# Patient Record
Sex: FEMALE | Race: WHITE | NOT HISPANIC OR LATINO | Employment: STUDENT | URBAN - METROPOLITAN AREA
[De-identification: names, ages, dates, MRNs, and addresses within clinical notes are randomized per-mention and may not be internally consistent; named-entity substitution may affect disease eponyms.]

---

## 2017-02-07 ENCOUNTER — ALLSCRIPTS OFFICE VISIT (OUTPATIENT)
Dept: OTHER | Facility: OTHER | Age: 22
End: 2017-02-07

## 2018-01-12 NOTE — PROGRESS NOTES
Assessment    1  Contraception (V25 9) (Z30 9)   2  Encounter for routine gynecological examination (V72 31) (Z01 419)    Plan  Contraception    · From  Tri-Previfem 0 18/0 215/0 25 MG-35 MCG Oral Tablet TAKE ONE  TABLET BY MOUTH EVERY DAY To Tri-Previfem 0 18/0 215/0 25 MG-35 MCG Oral  Tablet Take 1 tablet daily   Rx By: [facility]; Dispense: 84 Days ; #:1 X 28 Tablet Disp Pack (3 Disp Packs); Refill: 3; For: Contraception; KAMINI = Y; Sent To: [facility]/PHARMACY #0600 Encounter for routine gynecological examination    · Follow-up visit in 1 year Evaluation and Treatment  Follow-up  Status: Hold For -  Scheduling  Requested for: [id number]   Ordered; For: Encounter for routine gynecological examination; Ordered By: [facility] Performed:  Due: [id number]    Discussion/Summary    1  Yearly exam-Pap smear deferred, self breast awareness reviewed  2  History of bacterial vaginosis/yeast-resolved with treatment at last visit  3  Contraception-patient is doing well on Tri-Previfem  Electronic prescription for 3 packs refill 3 was sent to the local pharmacy  The patient will check to make sure she can get the prescription in this way  If any problems, she will contact us  4  History of oligomenorrhea/dysmenorrhea/menorrhagia-resolved with OCP  5  Acne-patient has seen dermatology in the past  She has noted improvement on OCP  6  Other-STD testing was deferred as it was done recently  she will follow-up in 1 year or as needed  Chief Complaint    1  Visit For: Preventive General Multisystem Exam    History of Present Illness  HPI: The patient was seen today for yearly exam  She denies any complaints  Her vaginitis symptoms have resolved since treatment at the last visit  Review of Systems    Constitutional: No fever, no chills, feels well, no tiredness, no recent weight gain or loss  ENT: no ear ache, no loss of hearing, no nosebleeds or nasal discharge, no sore throat or hoarseness     Cardiovascular: no complaints of slow or fast heart rate, no chest pain, no palpitations, no leg claudication or lower extremity edema  Respiratory: no complaints of shortness of breath, no wheezing, no dyspnea on exertion, no orthopnea or PND  Breasts: no complaints of breast pain, breast lump or nipple discharge  Gastrointestinal: no complaints of abdominal pain, no constipation, no nausea or diarrhea, no vomiting, no bloody stools  Genitourinary: no complaints of dysuria, no incontinence, no pelvic pain, no dysmenorrhea, no vaginal discharge or abnormal vaginal bleeding  Musculoskeletal: no complaints of arthralgia, no myalgia, no joint swelling or stiffness, no limb pain or swelling  Integumentary: no complaints of skin rash or lesion, no itching or dry skin, no skin wounds  Neurological: no complaints of headache, no confusion, no numbness or tingling, no dizziness or fainting  Active Problems    1  Acne (706 1) (L70 9)   2  Contraception (V25 9) (Z30 9)   3  Denied: History of self breast exam   4  Oligomenorrhea (626 1) (N91 5)    Past Medical History    · History of ADHD (attention deficit hyperactivity disorder), predominantly hyperactive  impulsive type (314 01) (F90 1)   · History of  0 (V61 8) (Z63 8)   · History of concussion (V15 52) (U95 319)   · History of dysmenorrhea (V13 29) (Z87 42)   · Denied: History of self breast exam   · History of Menorrhagia with regular cycle (626 2) (N92 0)    The active problems and past medical history were reviewed and updated today        Surgical History    · History of Appendectomy    Family History    · No pertinent family history    · Family history of hypertension (V17 49) (Z82 49)    · Family history of asthma (V17 5) (Z82 5)    Social History    · Denied: History of Alcohol use   · Always uses seat belt   · Caffeine use (V49 89) (F15 90)   · Denied: History of Drug use   · Denied: History of    · Never a smoker   · No preference on Zoroastrianism beliefs   · Oral contraceptives   · Single    Current Meds   1  Tri-Previfem 0 18/0 215/0 25 MG-35 MCG Oral Tablet; TAKE ONE TABLET BY MOUTH   EVERY DAY; Therapy: [id number] to [phone])  Requested for: 20Jan2016; Last   Rx:20Jan2016 Ordered    Allergies    1  No Known Drug Allergies    2  No Known Environmental Allergies   3  No Known Food Allergies    Vitals   Recorded: 14RZQ8955 30:31EV   Systolic 302, RUE, Sitting   Diastolic 78, RUE, Sitting   Height 5 ft 7 in   2-20 Stature Percentile 86 %   Weight 160 lb    2-20 Weight Percentile 88 %   BMI Calculated 25 06   BMI Percentile 80 %   BSA Calculated 1 84   LMP 25Jan2016     Physical Exam    Constitutional   General appearance: No acute distress, well appearing and well nourished  Neck   Neck: Normal, supple, trachea midline, no masses  Thyroid: Normal, no thyromegaly  Genitourinary   External genitalia: Normal and no lesions appreciated  Vagina: Normal, no lesions or dryness appreciated  Urethra: Normal     Urethral meatus: Normal     Bladder: Normal, soft, non-tender and no prolapse or masses appreciated  Cervix: Normal, no palpable masses  without lesions or discharge or cervicitis  No CMT  Uterus: Normal, non-tender, not enlarged, and no palpable masses  mid position, normal size, nontender, without mass  Adnexa/parametria: Normal, non-tender and no fullness or masses appreciated  Anus, perineum, and rectum: Normal sphincter tone, no masses, and no prolapse  Chest   Breasts: Normal and no dimpling or skin changes noted  Abdomen   Abdomen: Normal, non-tender, and no organomegaly noted  Liver and spleen: No hepatomegaly or splenomegaly  Examination for hernias: No hernias appreciated  Lymphatic   Palpation of lymph nodes in neck, axillae, groin and/or other locations: No lymphadenopathy or masses noted  Skin   Skin and subcutaneous tissue: Normal skin turgor and no rashes      Palpation of skin and subcutaneous tissue: Normal     Psychiatric   Orientation to person, place, and time: Normal     Mood and affect: Normal        Signatures   Electronically signed by : VERA Dominguez ; Feb 2 2016  5:49PM EST                       (Author)

## 2018-01-14 VITALS
HEIGHT: 67 IN | SYSTOLIC BLOOD PRESSURE: 106 MMHG | BODY MASS INDEX: 26.13 KG/M2 | DIASTOLIC BLOOD PRESSURE: 60 MMHG | WEIGHT: 166.5 LBS

## 2018-03-09 ENCOUNTER — TELEPHONE (OUTPATIENT)
Dept: OBGYN CLINIC | Facility: CLINIC | Age: 23
End: 2018-03-09

## 2018-04-04 ENCOUNTER — ANNUAL EXAM (OUTPATIENT)
Dept: OBGYN CLINIC | Facility: CLINIC | Age: 23
End: 2018-04-04
Payer: COMMERCIAL

## 2018-04-04 VITALS
DIASTOLIC BLOOD PRESSURE: 78 MMHG | SYSTOLIC BLOOD PRESSURE: 102 MMHG | WEIGHT: 172.6 LBS | HEIGHT: 66 IN | BODY MASS INDEX: 27.74 KG/M2

## 2018-04-04 DIAGNOSIS — Z11.51 SCREENING FOR HPV (HUMAN PAPILLOMAVIRUS): Primary | ICD-10-CM

## 2018-04-04 DIAGNOSIS — Z12.4 SCREENING FOR CERVICAL CANCER: ICD-10-CM

## 2018-04-04 DIAGNOSIS — Z01.419 WOMEN'S ANNUAL ROUTINE GYNECOLOGICAL EXAMINATION: ICD-10-CM

## 2018-04-04 DIAGNOSIS — Z30.41 ENCOUNTER FOR SURVEILLANCE OF CONTRACEPTIVE PILLS: ICD-10-CM

## 2018-04-04 PROCEDURE — G0145 SCR C/V CYTO,THINLAYER,RESCR: HCPCS | Performed by: PATHOLOGY

## 2018-04-04 PROCEDURE — 99395 PREV VISIT EST AGE 18-39: CPT | Performed by: OBSTETRICS & GYNECOLOGY

## 2018-04-04 PROCEDURE — G0124 SCREEN C/V THIN LAYER BY MD: HCPCS | Performed by: PATHOLOGY

## 2018-04-04 PROCEDURE — 87624 HPV HI-RISK TYP POOLED RSLT: CPT | Performed by: OBSTETRICS & GYNECOLOGY

## 2018-04-04 RX ORDER — DROSPIRENONE AND ETHINYL ESTRADIOL 0.02-3(28)
1 KIT ORAL DAILY
Qty: 28 TABLET | Refills: 12 | Status: SHIPPED | OUTPATIENT
Start: 2018-04-04 | End: 2019-04-22 | Stop reason: SDUPTHER

## 2018-04-04 RX ORDER — TRETINOIN 0.1 MG/G
GEL TOPICAL
Refills: 5 | COMMUNITY
Start: 2018-01-17

## 2018-04-04 RX ORDER — NORGESTIMATE AND ETHINYL ESTRADIOL 7DAYSX3 28
1 KIT ORAL DAILY
COMMUNITY
Start: 2015-07-22 | End: 2018-04-04 | Stop reason: DRUGHIGH

## 2018-04-04 NOTE — PROGRESS NOTES
Assessment/Plan: 1  Yearly exam-Pap smear done, self breast awareness reviewed  2  History of bacterial vaginosis/yeast-resolved with treatment previously  3  Contraception-patient is doing well on Tri-Previfem  However, her acne is worsened recently and she wished to try another pill  We discussed Elena the patient is interested in this  Electronic prescription for 1 pack refill numeral 12 was sent a local pharmacy  She will call or return with any issues  4  History of oligomenorrhea/dysmenorrhea/menorrhagia-resolved with OCP  5  Acne-patient has seen dermatology in the past  She had noted improvement on OCP, but now she will try Elena with prescription as written above  6  Other-STD testing was deferred as it was done December 2015  She has been with the same partner for 4 5 years  She is status post Gardisil injection x3 previously  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 goes to [employer] in the fall  My congratulations were given  She will follow-up in 1 year or as needed  No problem-specific Assessment & Plan notes found for this encounter  Diagnoses and all orders for this visit:    Screening for HPV (human papillomavirus)  -     Liquid-based pap, screening    Screening for cervical cancer  -     Liquid-based pap, screening    Women's annual routine gynecological examination    Encounter for surveillance of contraceptive pills    Other orders  -     Discontinue: norgestimate-ethinyl estradiol (TRI-PREVIFEM) 0 18/0 215/0 25 MG-35 MCG per tablet; Take 1 tablet by mouth daily  -     tretinoin (RETIN-A) 0 01 % gel; USE 1 (ONE) APPLICATION(S) AT BEDTIME  APPLY SPARINGLY  Subjective:      Patient ID: Nevaeh Ceron is a 26 y o  female  The patient was seen today for yearly exam   Please see assessment plan for details          The following portions of the patient's history were reviewed and updated as appropriate: allergies, current medications, past family history, past medical history, past social history, past surgical history and problem list     Review of Systems   Constitutional: Negative for chills, diaphoresis, fatigue and fever  Respiratory: Negative for apnea, cough, chest tightness, shortness of breath and wheezing  Cardiovascular: Negative for chest pain, palpitations and leg swelling  Gastrointestinal: Negative for abdominal distention, abdominal pain, anal bleeding, constipation, diarrhea, nausea, rectal pain and vomiting  Genitourinary: Negative for difficulty urinating, dyspareunia, dysuria, frequency, hematuria, menstrual problem, pelvic pain, urgency, vaginal bleeding, vaginal discharge and vaginal pain  Musculoskeletal: Negative for arthralgias, back pain and myalgias  Skin: Negative for color change and rash  Neurological: Negative for dizziness, syncope, light-headedness, numbness and headaches  Hematological: Negative for adenopathy  Does not bruise/bleed easily  Psychiatric/Behavioral: Negative for dysphoric mood and sleep disturbance  The patient is not nervous/anxious  Objective:      /78 (BP Location: Left arm, Patient Position: Sitting, Cuff Size: Standard)   Ht 5' 6" (1 676 m)   Wt 78 3 kg (172 lb 9 6 oz)   LMP 03/15/2018 (Exact Date)   BMI 27 86 kg/m²          Physical Exam    Objective      /78 (BP Location: Left arm, Patient Position: Sitting, Cuff Size: Standard)   Ht 5' 6" (1 676 m)   Wt 78 3 kg (172 lb 9 6 oz)   LMP 03/15/2018 (Exact Date)   BMI 27 86 kg/m²     General:   alert and oriented, in no acute distress, alert, appears stated age and cooperative   Neck: normal to inspection and palpation   Breast: normal appearance, no masses or tenderness   Heart:    Lungs:    Abdomen: soft, non-tender, without masses or organomegaly   Vulva: normal   Vagina: Without lesions or discharge noted  No erythema appreciated   Cervix:  Without lesions or discharge or cervicitis  No Cervical motion tenderness    Light bleeding with Pap test   Uterus: normal size, anteverted, non-tender   Adnexa: no mass, fullness, tenderness   Rectum: negative

## 2018-04-04 NOTE — PATIENT INSTRUCTIONS
Follow-up 1 year for yearly examAcne   WHAT YOU NEED TO KNOW:   What is acne? Acne is a skin condition that is common in adolescents  It usually gets better over time, and is usually gone by about age 22  Acne can continue into adulthood for some people  What causes or increases my risk for acne? Acne occurs when pores become blocked with dead skin cells, oil, or bacteria  Pores are openings in your skin where oil, sweat, and hair are produced  Acne may be caused by high hormone levels during puberty  Hormonal changes caused by birth control pills, menstrual cycles, or pregnancy can cause acne in females  Medicines such as antidepressants and antiseizure medicines can also cause acne  Sensitive skin or a family history of acne increases your risk  The following can make your acne worse:  · Oil-based cosmetics or hair products, or suntan oil    · Rubbing your skin too hard or picking at your pimples    · Stress    · Pressure on the skin from sports helmets or backpacks  What are the different types of acne? Acne most often appears on the face, neck, upper chest, back, and upper arms  · Whiteheads  are closed, white bumps that form when the pore is completely blocked  · Blackheads  are tiny, dark spots that form when the pore is blocked but stays open  · Pimples  are inflamed bumps that contain pus  They are often caused by clogged pores  Pimples develop when whiteheads or blackheads get infected  · Cystic acne  is made up of large inflamed nodules or cysts that contain pus  They look like large pimples and form deep inside the skin  They may cause pain and scars  How is acne diagnosed? Your healthcare provider will examine your skin and ask if you have ever received treatment for acne  Tell him or her how long your acne lasts and which face care products you use  Your provider may ask if anything makes your acne better or worse  You may need blood tests to check your hormone levels    How is acne treated? Treatment depends on how severe your acne is  Your healthcare provider may recommend any of the following:  · Over-the-counter acne medicines  with benzoyl peroxide and salicylic acid may help to treat mild acne  They are available in the form of gels, lotions, creams, pads, or soaps  It may take several weeks for you to see an improvement  Follow the directions on the medicine label  Do not use more than directed  This medicine can cause dry and red skin if you use too much  · Prescription medicines  may be needed if over-the-counter medicines do not help after 2 months  You may need to take more than one kind of medicine to treat your acne  This may include antibiotics that kill bacteria and help decrease swelling  A type of prescription acne medicine called retinoids may cause serious birth defects  Do not  use this medicine if you are pregnant or may become pregnant  · Light therapy  may help decrease your acne  Ask your healthcare provider for more information about light therapy  What else can I do to manage or prevent acne? · Wash your face 2 times a day  with a gentle cleanser  This helps decrease oil buildup that leads to acne  Also wash your face if you have been sweating a lot, such as after exercise  · Use oil-free products  This includes sunscreen, moisturizers, and cosmetics  Hair products should also be oil-free  · Regularly wash your hair  to decrease oil  Oily hair that touches your face can increase acne  · Avoid touching your face  as much as possible  Do not pick, squeeze, or pop your pimples  This can make your acne worse because your hands contain oil  It can also cause scars to form on your face  · Avoid things that rub against your skin  as much as possible  This includes hats, helmets, and backpacks  When should I contact my healthcare provider? · You use retinoid medicine and you think you might be pregnant      · You use retinoid medicine and begin to have mood swings or personality changes  · You feel depressed  · You have a fever and inflammation of your skin  · Your acne does not get better, even after treatment  · You have questions or concerns about your condition or care  CARE AGREEMENT:   You have the right to help plan your care  Learn about your health condition and how it may be treated  Discuss treatment options with your caregivers to decide what care you want to receive  You always have the right to refuse treatment  The above information is an  only  It is not intended as medical advice for individual conditions or treatments  Talk to your doctor, nurse or pharmacist before following any medical regimen to see if it is safe and effective for you  © 2017 [employer]  Information is for End User's use only and may not be sold, redistributed or otherwise used for commercial purposes  All illustrations and images included in CareNotes® are the copyrighted property of A D A M , Inc  or [employer]

## 2018-04-13 LAB — HPV RRNA GENITAL QL NAA+PROBE: NORMAL

## 2018-04-18 LAB
LAB AP GYN PRIMARY INTERPRETATION: NORMAL
Lab: NORMAL
PATH INTERP SPEC-IMP: NORMAL

## 2018-08-01 DIAGNOSIS — B37.3 VAGINAL YEAST INFECTION: Primary | ICD-10-CM

## 2018-08-01 RX ORDER — FLUCONAZOLE 150 MG/1
150 TABLET ORAL
Qty: 2 TABLET | Refills: 0 | Status: SHIPPED | OUTPATIENT
Start: 2018-08-01 | End: 2018-08-09

## 2018-08-01 NOTE — TELEPHONE ENCOUNTER
You saw this pt in April for her yearly    She has signs of a yeast infection and she tried the 3 day Monistat with no relief    Can we give her this script?

## 2018-08-09 ENCOUNTER — DOCTOR'S OFFICE (OUTPATIENT)
Dept: URBAN - METROPOLITAN AREA CLINIC 136 | Facility: CLINIC | Age: 23
Setting detail: OPHTHALMOLOGY
End: 2018-08-09
Payer: COMMERCIAL

## 2018-08-09 DIAGNOSIS — H52.13: ICD-10-CM

## 2018-08-09 PROCEDURE — 92310 CONTACT LENS FITTING OU: CPT | Performed by: OPTOMETRIST

## 2018-08-09 PROCEDURE — 92004 COMPRE OPH EXAM NEW PT 1/>: CPT | Performed by: OPTOMETRIST

## 2018-08-09 PROCEDURE — 92015 DETERMINE REFRACTIVE STATE: CPT | Performed by: OPTOMETRIST

## 2018-08-09 ASSESSMENT — REFRACTION_MANIFEST
OD_VA2: 20/
OU_VA: 20/
OD_VA1: 20/
OS_VA1: 20/
OS_VA3: 20/
OU_VA: 20/
OS_VA2: 20/
OS_VA3: 20/
OD_VA3: 20/
OD_VA2: 20/
OD_VA1: 20/
OS_VA1: 20/
OS_VA2: 20/
OD_VA3: 20/

## 2018-08-09 ASSESSMENT — REFRACTION_CURRENTRX
OS_OVR_VA: 20/
OS_OVR_VA: 20/
OD_OVR_VA: 20/
OD_OVR_VA: 20/
OS_OVR_VA: 20/
OD_OVR_VA: 20/

## 2018-08-09 ASSESSMENT — REFRACTION_OUTSIDERX
OD_VA1: 20/20
OD_VA3: 20/
OD_VA2: 20/
OS_SPHERE: -1.25
OD_SPHERE: -1.00
OS_VA2: 20/
OS_CYLINDER: SPH
OS_VA3: 20/
OS_VA1: 20/20
OU_VA: 20/20
OD_CYLINDER: SPH

## 2018-08-09 ASSESSMENT — CONFRONTATIONAL VISUAL FIELD TEST (CVF)
OS_FINDINGS: FULL
OD_FINDINGS: FULL

## 2018-08-09 ASSESSMENT — VISUAL ACUITY
OS_BCVA: 20/20
OD_BCVA: 20/20

## 2018-08-16 ENCOUNTER — TELEPHONE (OUTPATIENT)
Dept: OBGYN CLINIC | Facility: CLINIC | Age: 23
End: 2018-08-16

## 2018-09-07 ENCOUNTER — OPTICAL OFFICE (OUTPATIENT)
Dept: URBAN - METROPOLITAN AREA CLINIC 143 | Facility: CLINIC | Age: 23
Setting detail: OPHTHALMOLOGY
End: 2018-09-07
Payer: COMMERCIAL

## 2018-09-07 DIAGNOSIS — H52.13: ICD-10-CM

## 2018-09-07 PROCEDURE — S0500 DISPOS CONT LENS: HCPCS | Performed by: OPTOMETRIST

## 2018-12-20 ENCOUNTER — TELEPHONE (OUTPATIENT)
Dept: UROLOGY | Facility: AMBULATORY SURGERY CENTER | Age: 23
End: 2018-12-20

## 2018-12-20 NOTE — TELEPHONE ENCOUNTER
Reason for appointment/Complaint/Diagnosis : pain in abdomen/hx of gross hematuria    Insurance: [employer]    History of Cancer? no                       If yes, what kind? Previous urologist?     None                  Records requested/where? No    Outside testing/where? No    Location Preference for office visit?  [place]

## 2018-12-26 RX ORDER — MOXIFLOXACIN 5 MG/ML
SOLUTION/ DROPS OPHTHALMIC
Refills: 0 | COMMUNITY
Start: 2018-11-09

## 2018-12-26 RX ORDER — TOBRAMYCIN 3 MG/ML
SOLUTION/ DROPS OPHTHALMIC
Refills: 0 | COMMUNITY
Start: 2018-10-09

## 2018-12-26 RX ORDER — ISOTRETINOIN 40 MG/1
40 CAPSULE, LIQUID FILLED ORAL 2 TIMES DAILY WITH MEALS
Refills: 0 | COMMUNITY
Start: 2018-11-16

## 2019-04-22 DIAGNOSIS — Z30.41 ENCOUNTER FOR SURVEILLANCE OF CONTRACEPTIVE PILLS: ICD-10-CM

## 2019-04-24 ENCOUNTER — ANNUAL EXAM (OUTPATIENT)
Dept: OBGYN CLINIC | Facility: CLINIC | Age: 24
End: 2019-04-24
Payer: COMMERCIAL

## 2019-04-24 VITALS
WEIGHT: 164 LBS | BODY MASS INDEX: 25.74 KG/M2 | SYSTOLIC BLOOD PRESSURE: 108 MMHG | DIASTOLIC BLOOD PRESSURE: 72 MMHG | HEIGHT: 67 IN

## 2019-04-24 DIAGNOSIS — L70.9 ACNE, UNSPECIFIED ACNE TYPE: ICD-10-CM

## 2019-04-24 DIAGNOSIS — Z01.419 WOMEN'S ANNUAL ROUTINE GYNECOLOGICAL EXAMINATION: ICD-10-CM

## 2019-04-24 DIAGNOSIS — Z30.41 ENCOUNTER FOR SURVEILLANCE OF CONTRACEPTIVE PILLS: Primary | ICD-10-CM

## 2019-04-24 PROCEDURE — 99395 PREV VISIT EST AGE 18-39: CPT | Performed by: OBSTETRICS & GYNECOLOGY

## 2019-04-24 RX ORDER — DROSPIRENONE AND ETHINYL ESTRADIOL 0.02-3(28)
1 KIT ORAL DAILY
Qty: 84 TABLET | Refills: 3 | Status: SHIPPED | OUTPATIENT
Start: 2019-04-24 | End: 2020-06-04 | Stop reason: SDUPTHER

## 2019-06-26 ENCOUNTER — DOCTOR'S OFFICE (OUTPATIENT)
Dept: URBAN - METROPOLITAN AREA CLINIC 136 | Facility: CLINIC | Age: 24
Setting detail: OPHTHALMOLOGY
End: 2019-06-26

## 2019-06-26 DIAGNOSIS — H52.13: ICD-10-CM

## 2019-06-26 PROCEDURE — SELFPAYVIS VISION VISIT SELF PAY: Performed by: OPTOMETRIST

## 2019-06-26 ASSESSMENT — SPHEQUIV_DERIVED
OD_SPHEQUIV: -1
OS_SPHEQUIV: -1.625

## 2019-06-26 ASSESSMENT — REFRACTION_MANIFEST
OD_CYLINDER: SPH
OS_CYLINDER: SPH
OU_VA: 20/20
OS_VA1: 20/
OD_VA3: 20/
OD_VA3: 20/
OD_SPHERE: -1.25
OD_VA1: 20/20
OS_VA3: 20/
OS_VA3: 20/
OU_VA: 20/
OS_SPHERE: -1.75
OD_VA2: 20/20
OS_VA2: 20/20
OS_VA1: 20/20
OD_VA1: 20/
OD_VA2: 20/
OS_VA2: 20/

## 2019-06-26 ASSESSMENT — VISUAL ACUITY
OS_BCVA: 20/20
OD_BCVA: 20/20

## 2019-06-26 ASSESSMENT — KERATOMETRY
METHOD_AUTO_MANUAL: AUTO
OD_K2POWER_DIOPTERS: 43.75
OS_K2POWER_DIOPTERS: 43.00
OS_AXISANGLE_DEGREES: 065
OD_AXISANGLE_DEGREES: 097
OS_K1POWER_DIOPTERS: 42.25
OD_K1POWER_DIOPTERS: 43.00

## 2019-06-26 ASSESSMENT — REFRACTION_CURRENTRX
OD_OVR_VA: 20/
OD_OVR_VA: 20/
OS_OVR_VA: 20/
OD_OVR_VA: 20/

## 2019-06-26 ASSESSMENT — CONFRONTATIONAL VISUAL FIELD TEST (CVF)
OS_FINDINGS: FULL
OD_FINDINGS: FULL

## 2019-06-26 ASSESSMENT — REFRACTION_AUTOREFRACTION
OS_AXIS: 116
OD_CYLINDER: -0.50
OD_SPHERE: -0.75
OS_SPHERE: -1.25
OS_CYLINDER: -0.75
OD_AXIS: 022

## 2019-06-26 ASSESSMENT — AXIALLENGTH_DERIVED
OS_AL: 24.5867
OD_AL: 24.0356

## 2019-07-31 ENCOUNTER — DOCTOR'S OFFICE (OUTPATIENT)
Dept: URBAN - METROPOLITAN AREA CLINIC 136 | Facility: CLINIC | Age: 24
Setting detail: OPHTHALMOLOGY
End: 2019-07-31

## 2019-07-31 DIAGNOSIS — H52.13: ICD-10-CM

## 2019-07-31 PROCEDURE — CLFUP CONTACT LENS FOLLOW-UP: Performed by: OPTOMETRIST

## 2019-07-31 ASSESSMENT — REFRACTION_CURRENTRX
OS_OVR_VA: 20/
OS_OVR_VA: 20/
OD_OVR_VA: 20/
OS_OVR_VA: 20/
OD_OVR_VA: 20/
OD_OVR_VA: 20/

## 2019-07-31 ASSESSMENT — REFRACTION_AUTOREFRACTION
OD_SPHERE: -0.75
OD_AXIS: 022
OS_CYLINDER: -0.75
OS_AXIS: 116
OS_SPHERE: -1.25
OD_CYLINDER: -0.50

## 2019-07-31 ASSESSMENT — REFRACTION_MANIFEST
OS_VA2: 20/
OS_VA3: 20/
OS_SPHERE: -1.75
OS_VA1: 20/20
OD_VA2: 20/
OS_VA3: 20/
OU_VA: 20/20
OD_VA2: 20/20
OS_VA1: 20/
OD_CYLINDER: SPH
OS_CYLINDER: SPH
OU_VA: 20/
OS_VA2: 20/20
OD_VA3: 20/
OD_VA1: 20/
OD_VA3: 20/
OD_SPHERE: -1.25
OD_VA1: 20/20

## 2019-07-31 ASSESSMENT — VISUAL ACUITY
OS_BCVA: 20/20
OD_BCVA: 20/20

## 2019-07-31 ASSESSMENT — SPHEQUIV_DERIVED
OS_SPHEQUIV: -1.625
OD_SPHEQUIV: -1

## 2019-09-12 ENCOUNTER — OPTICAL OFFICE (OUTPATIENT)
Dept: URBAN - METROPOLITAN AREA CLINIC 143 | Facility: CLINIC | Age: 24
Setting detail: OPHTHALMOLOGY
End: 2019-09-12
Payer: COMMERCIAL

## 2019-09-12 DIAGNOSIS — H52.13: ICD-10-CM

## 2019-09-12 PROCEDURE — S0500 DISPOS CONT LENS: HCPCS | Performed by: OPTOMETRIST

## 2019-11-24 DIAGNOSIS — Z30.41 ENCOUNTER FOR SURVEILLANCE OF CONTRACEPTIVE PILLS: ICD-10-CM

## 2019-11-25 NOTE — TELEPHONE ENCOUNTER
Received prescription request   Patient was seen April 2019 with 84 pills with 3 refills sent at that time  Please investigate why prescription request receive now    Thanks

## 2019-12-14 DIAGNOSIS — Z30.41 ENCOUNTER FOR SURVEILLANCE OF CONTRACEPTIVE PILLS: ICD-10-CM

## 2020-04-20 ENCOUNTER — TELEPHONE (OUTPATIENT)
Dept: OBGYN CLINIC | Facility: CLINIC | Age: 25
End: 2020-04-20

## 2020-06-04 DIAGNOSIS — Z30.41 ENCOUNTER FOR SURVEILLANCE OF CONTRACEPTIVE PILLS: ICD-10-CM

## 2020-06-04 RX ORDER — DROSPIRENONE AND ETHINYL ESTRADIOL 0.02-3(28)
1 KIT ORAL DAILY
Qty: 84 TABLET | Refills: 0 | Status: SHIPPED | OUTPATIENT
Start: 2020-06-04 | End: 2020-08-27

## 2020-06-04 RX ORDER — DROSPIRENONE AND ETHINYL ESTRADIOL 0.02-3(28)
1 KIT ORAL DAILY
Qty: 84 TABLET | Refills: 0 | Status: SHIPPED | OUTPATIENT
Start: 2020-06-04 | End: 2020-06-04 | Stop reason: SDUPTHER

## 2020-08-12 ENCOUNTER — ANNUAL EXAM (OUTPATIENT)
Dept: OBGYN CLINIC | Facility: CLINIC | Age: 25
End: 2020-08-12
Payer: COMMERCIAL

## 2020-08-12 VITALS
DIASTOLIC BLOOD PRESSURE: 70 MMHG | HEIGHT: 67 IN | TEMPERATURE: 97.7 F | SYSTOLIC BLOOD PRESSURE: 120 MMHG | BODY MASS INDEX: 25.43 KG/M2 | WEIGHT: 162 LBS

## 2020-08-12 DIAGNOSIS — Z30.41 ORAL CONTRACEPTIVE USE: Primary | ICD-10-CM

## 2020-08-12 PROCEDURE — 99395 PREV VISIT EST AGE 18-39: CPT | Performed by: OBSTETRICS & GYNECOLOGY

## 2020-08-12 RX ORDER — DROSPIRENONE AND ETHINYL ESTRADIOL 0.02-3(28)
KIT ORAL
COMMUNITY
End: 2020-08-12 | Stop reason: SDUPTHER

## 2020-08-12 RX ORDER — DROSPIRENONE AND ETHINYL ESTRADIOL 0.02-3(28)
1 KIT ORAL DAILY
Qty: 84 TABLET | Refills: 3 | Status: SHIPPED | OUTPATIENT
Start: 2020-08-12 | End: 2021-08-25 | Stop reason: SDUPTHER

## 2020-08-12 NOTE — PROGRESS NOTES
CC:  Annual exam    HPI: Nikko Dutton presents for routine yearly visit  Medicine is doing well and offers no complaints or concerns at this time  She has been taking Gianvi for contraception and denies any adverse effects  She wishes to continue  Past Medical History:  Past Medical History:   Diagnosis Date    ADHD (attention deficit hyperactivity disorder), predominantly hyperactive impulsive type     Concussion     Dysmenorrhea     Urinary tract infection        Past Surgical History:  Past Surgical History:   Procedure Laterality Date    APPENDECTOMY      TONSILLECTOMY AND ADENOIDECTOMY         Past OB/Gyn History:  Menstrual cycles every 28 days, with 4 days of light bleeding  Denies any history of sexually transmitted infection  No history of abnormal pap smears   Her last pap smear was 2018 and normal     ALLERGIES: No Known Allergies    MEDS:   Current Outpatient Medications:     CLARAVIS 40 MG capsule    drospirenone-ethinyl estradiol (Gianvi) 3-0 02 MG per tablet    drospirenone-ethinyl estradiol (Gianvi) 3-0 02 MG per tablet    moxifloxacin (VIGAMOX) 0 5 % ophthalmic solution    tobramycin (TOBREX) 0 3 % SOLN    tretinoin (RETIN-A) 0 01 % gel    Family History:  Family History   Problem Relation Age of Onset    Hypertension Father     Asthma Maternal Grandmother     Breast cancer Maternal Grandmother     Ovarian cysts Maternal Grandmother     Ovarian cysts Mother     Ovarian cysts Paternal Aunt        Social History:  Social History     Socioeconomic History    Marital status: Single     Spouse name: Not on file    Number of children: Not on file    Years of education: Not on file    Highest education level: Not on file   Occupational History    Not on file   Social Needs    Financial resource strain: Not on file    Food insecurity     Worry: Not on file     Inability: Not on file    Transportation needs     Medical: Not on file     Non-medical: Not on file   Tobacco Use    Smoking status: Never Smoker    Smokeless tobacco: Never Used   Substance and Sexual Activity    Alcohol use: Yes     Comment: Socially    Drug use: No    Sexual activity: Yes     Partners: Male     Birth control/protection: Pill, Condom   Lifestyle    Physical activity     Days per week: Not on file     Minutes per session: Not on file    Stress: Not on file   Relationships    Social connections     Talks on phone: Not on file     Gets together: Not on file     Attends Faith service: Not on file     Active member of club or organization: Not on file     Attends meetings of clubs or organizations: Not on file     Relationship status: Not on file    Intimate partner violence     Fear of current or ex partner: Not on file     Emotionally abused: Not on file     Physically abused: Not on file     Forced sexual activity: Not on file   Other Topics Concern    Not on file   Social History Narrative    Always wears seat belt     Caffeine use     No preference on Faith beliefs          Review of Systems:  Gen:   Denies fatigue, chills, nausea, vomiting, fever  Skin: No rashes or discolorations of any concern  RESP: Denies SOB, no cough  CV: Denies chest pain or palpitations  Breasts: Denies masses, pain, skin changes and nipple discharge  GI: Denies abdominal pain, heartburn, nausea, vomiting, changes in bowel habits  : Denies dysuria, frequency, CVA tenderness, incontinence and hematuria  Genitalia: Denies abnormal vaginal discharge, external lesions, rashes, pelvic pain, pressure, abnormal bleeding  Rectal:  Denies pain, bleeding, hemorrhoids,    Physical Exam:  /70 (BP Location: Left arm, Patient Position: Sitting, Cuff Size: Standard)   Temp 97 7 °F (36 5 °C)   Ht 5' 7" (1 702 m)   Wt 73 5 kg (162 lb)   LMP 08/11/2020   BMI 25 37 kg/m²    Gen: The patient was alert and oriented x3, pleasant well-appearing female in no acute distress     Neck:  Unremarkable, no lymphadenopathy, no thyromegaly, or tenderness  CV:  RRR, no murmurs  Resp:  Clear to auscultation bilaterally, no wheezing  Breasts: Symmetric  No dominant, discrete, fixed  or suspicious masses are noted  No skin or nipple changes  No palpable axillary nodes  No supraclavicular adenopathy  Abd:  Soft, nontender, nondistended, no masses or organomegaly  Back:  No CVA tenderness, no tenderness to palpation along spine  Pelvic:  Normal appearing external female genitalia, no visible lesions, no rashes  Vagina is free of discharge, normal vaginal epithelium, no abnormal  lesions, no evidence of prolapse anteriorly or posteriorly  Normal appearing cervix, mobile and nontender  A thin prep pap smear was not obtained today  Uterus is normal size, mobile and, nontender  No palpable adnexal masses or tenderness  No anoperineal lesions  Rectal:  No masses, tenderness, hemorrhoids, or obvious blood  Skin:  No concerning lesions  Extremeties: No edema      Assessment & Plan:   1  Routine annual exam      RTO one year orPRN  2  Encounter for follow-up of birth control pill usage  Patient may continue for 1 year

## 2020-10-21 ENCOUNTER — TELEPHONE (OUTPATIENT)
Dept: OBGYN CLINIC | Facility: CLINIC | Age: 25
End: 2020-10-21

## 2020-10-22 ENCOUNTER — TELEPHONE (OUTPATIENT)
Dept: OTHER | Facility: OTHER | Age: 25
End: 2020-10-22

## 2020-11-04 ENCOUNTER — CONSULT (OUTPATIENT)
Dept: OBGYN CLINIC | Facility: CLINIC | Age: 25
End: 2020-11-04
Payer: COMMERCIAL

## 2020-11-04 VITALS
WEIGHT: 164 LBS | TEMPERATURE: 97.3 F | HEIGHT: 67 IN | DIASTOLIC BLOOD PRESSURE: 80 MMHG | BODY MASS INDEX: 25.74 KG/M2 | SYSTOLIC BLOOD PRESSURE: 120 MMHG

## 2020-11-04 DIAGNOSIS — M54.6 CHRONIC MIDLINE THORACIC BACK PAIN: Primary | ICD-10-CM

## 2020-11-04 DIAGNOSIS — G89.29 CHRONIC MIDLINE THORACIC BACK PAIN: Primary | ICD-10-CM

## 2020-11-04 PROCEDURE — 99213 OFFICE O/P EST LOW 20 MIN: CPT | Performed by: OBSTETRICS & GYNECOLOGY

## 2021-08-25 ENCOUNTER — ANNUAL EXAM (OUTPATIENT)
Dept: OBGYN CLINIC | Facility: CLINIC | Age: 26
End: 2021-08-25
Payer: COMMERCIAL

## 2021-08-25 VITALS — HEIGHT: 67 IN | WEIGHT: 173.6 LBS | BODY MASS INDEX: 27.25 KG/M2

## 2021-08-25 DIAGNOSIS — Z30.41 ENCOUNTER FOR SURVEILLANCE OF CONTRACEPTIVE PILLS: ICD-10-CM

## 2021-08-25 DIAGNOSIS — Z30.41 ORAL CONTRACEPTIVE USE: ICD-10-CM

## 2021-08-25 DIAGNOSIS — Z01.419 ENCOUNTER FOR GYNECOLOGICAL EXAMINATION WITHOUT ABNORMAL FINDING: Primary | ICD-10-CM

## 2021-08-25 DIAGNOSIS — N39.0 FREQUENT URINARY TRACT INFECTIONS: ICD-10-CM

## 2021-08-25 PROCEDURE — G0145 SCR C/V CYTO,THINLAYER,RESCR: HCPCS | Performed by: OBSTETRICS & GYNECOLOGY

## 2021-08-25 PROCEDURE — 99395 PREV VISIT EST AGE 18-39: CPT | Performed by: OBSTETRICS & GYNECOLOGY

## 2021-08-25 RX ORDER — DROSPIRENONE AND ETHINYL ESTRADIOL 0.02-3(28)
1 KIT ORAL DAILY
Qty: 84 TABLET | Refills: 3 | Status: SHIPPED | OUTPATIENT
Start: 2021-08-25 | End: 2021-11-17

## 2021-08-25 NOTE — PROGRESS NOTES
CC:  Annual exam    HPI: Junior Aguilar presents for annual gyn exam   Jonelle Fnoseca is doing well and her only concern is the issue regarding bladder infections  Over the past several months, the patient already has had for bladder infections  She was advised to have a consultation with a urologist at a business card of the [facility] screw was given her  She is doing well with her birth control pill and denies any adverse effects  Sandy would like to stay on these  Aniyah Sinks Past Medical History:  Past Medical History:   Diagnosis Date    ADHD (attention deficit hyperactivity disorder), predominantly hyperactive impulsive type     Concussion     Dysmenorrhea     Urinary tract infection        Past Surgical History:  Past Surgical History:   Procedure Laterality Date    APPENDECTOMY      TONSILLECTOMY AND ADENOIDECTOMY         Past OB/Gyn History:  Menstrual cycles every 28 days, with 3 days of normal bleeding  Denies any history of sexually transmitted infection  No history of abnormal pap smears  Her last pap smear was  2018      ALLERGIES: No Known Allergies    MEDS:   Current Outpatient Medications:     CLARAVIS 40 MG capsule    drospirenone-ethinyl estradiol (Gianvi) 3-0 02 MG per tablet    drospirenone-ethinyl estradiol (Gianvi) 3-0 02 MG per tablet    moxifloxacin (VIGAMOX) 0 5 % ophthalmic solution    tobramycin (TOBREX) 0 3 % SOLN    tretinoin (RETIN-A) 0 01 % gel    Vestura 3-0 02 MG per tablet    Family History:  Family History   Problem Relation Age of Onset    Hypertension Father     Asthma Maternal Grandmother     Breast cancer Maternal Grandmother     Ovarian cysts Maternal Grandmother     Ovarian cysts Mother     Ovarian cysts Paternal Aunt        Social History:  Social History     Socioeconomic History    Marital status: Single     Spouse name: Not on file    Number of children: Not on file    Years of education: Not on file    Highest education level: Not on file Occupational History    Not on file   Tobacco Use    Smoking status: Never Smoker    Smokeless tobacco: Never Used   Vaping Use    Vaping Use: Never used   Substance and Sexual Activity    Alcohol use: Yes     Comment: Socially    Drug use: No    Sexual activity: Yes     Partners: Male     Birth control/protection: Pill, Condom   Other Topics Concern    Not on file   Social History Narrative    Always wears seat belt     Caffeine use     No preference on [employer] beliefs      Social Determinants of Health     Financial Resource Strain:     Difficulty of Paying Living Expenses:    Food Insecurity:     Worried About Running Out of Food in the Last Year:     Ran Out of Food in the Last Year:    Transportation Needs:     Lack of Transportation (Medical):  Lack of Transportation (Non-Medical):    Physical Activity:     Days of Exercise per Week:     Minutes of Exercise per Session:    Stress:     Feeling of Stress :    Social Connections:     Frequency of Communication with Friends and Family:     Frequency of Social Gatherings with Friends and Family:     Attends Mosque Services:     Active Member of Clubs or Organizations:     Attends Club or Organization Meetings:     Marital Status:    Intimate Partner Violence:     Fear of Current or Ex-Partner:     Emotionally Abused:     Physically Abused:     Sexually Abused:          Review of Systems:  Gen:   Denies fatigue, chills, nausea, vomiting, fever  Skin: No rashes or discolorations of any concern  RESP: Denies SOB, no cough  CV: Denies chest pain or palpitations  Breasts: Denies masses, pain, skin changes and nipple discharge  GI: Denies abdominal pain, heartburn, nausea, vomiting, changes in bowel habits  : Denies dysuria, frequency, CVA tenderness, incontinence and hematuria  Genitalia: Denies abnormal vaginal discharge, external lesions, rashes, pelvic pain, pressure, abnormal bleeding    Rectal:  Denies pain, bleeding, hemorrhoids,    Physical Exam:  Ht 5' 7" (1 702 m)   Wt 78 7 kg (173 lb 9 6 oz)   LMP 08/15/2021   BMI 27 19 kg/m²    Gen: The patient was alert and oriented x3, pleasant well-appearing female in no acute distress  Neck:  Unremarkable, no lymphadenopathy, no thyromegaly, or tenderness  CV:  RRR, no murmurs  Resp:  Clear to auscultation bilaterally, no wheezing  Breasts: Symmetric  No dominant, discrete, fixed  or suspicious masses are noted  No skin or nipple changes  No palpable axillary nodes  No supraclavicular adenopathy  Abd:  Soft, nontender, nondistended, no masses or organomegaly  Back:  No CVA tenderness, no tenderness to palpation along spine  Pelvic:  Normal appearing external female genitalia, no visible lesions, no rashes  Vagina is free of discharge, normal vaginal epithelium, no abnormal  lesions, no evidence of prolapse anteriorly or posteriorly  Normal appearing cervix, mobile and nontender  A thin prep pap smear was obtained today  Uterus is normal size, mobile and, nontender  No palpable adnexal masses or tenderness  No anoperineal lesions  Skin:  No concerning lesions  Extremeties: No edema      Assessment & Plan:   1  Routine annual exam , no issues noted  RTO one year orPRN  2  Encounter for surveillance of oral contraceptive pills  Patient may continue current pill for 1 year       3    Cervical cancer screening, Pap smear performed today

## 2021-08-31 LAB
LAB AP GYN PRIMARY INTERPRETATION: NORMAL
Lab: NORMAL

## 2022-08-29 ENCOUNTER — ANNUAL EXAM (OUTPATIENT)
Dept: OBGYN CLINIC | Facility: CLINIC | Age: 27
End: 2022-08-29
Payer: COMMERCIAL

## 2022-08-29 VITALS
WEIGHT: 180 LBS | HEIGHT: 67 IN | SYSTOLIC BLOOD PRESSURE: 102 MMHG | BODY MASS INDEX: 28.25 KG/M2 | DIASTOLIC BLOOD PRESSURE: 74 MMHG

## 2022-08-29 DIAGNOSIS — Z11.3 SCREENING FOR STD (SEXUALLY TRANSMITTED DISEASE): ICD-10-CM

## 2022-08-29 DIAGNOSIS — B96.89 BV (BACTERIAL VAGINOSIS): ICD-10-CM

## 2022-08-29 DIAGNOSIS — N89.8 VAGINAL ITCHING: ICD-10-CM

## 2022-08-29 DIAGNOSIS — N89.8 VAGINAL DISCHARGE: ICD-10-CM

## 2022-08-29 DIAGNOSIS — N76.0 BV (BACTERIAL VAGINOSIS): ICD-10-CM

## 2022-08-29 DIAGNOSIS — Z01.419 WOMEN'S ANNUAL ROUTINE GYNECOLOGICAL EXAMINATION: ICD-10-CM

## 2022-08-29 PROCEDURE — 57150 TREAT VAGINA INFECTION: CPT | Performed by: OBSTETRICS & GYNECOLOGY

## 2022-08-29 PROCEDURE — 87491 CHLMYD TRACH DNA AMP PROBE: CPT | Performed by: OBSTETRICS & GYNECOLOGY

## 2022-08-29 PROCEDURE — 87591 N.GONORRHOEAE DNA AMP PROB: CPT | Performed by: OBSTETRICS & GYNECOLOGY

## 2022-08-29 PROCEDURE — S0612 ANNUAL GYNECOLOGICAL EXAMINA: HCPCS | Performed by: OBSTETRICS & GYNECOLOGY

## 2022-08-29 RX ORDER — CLINDAMYCIN HYDROCHLORIDE 300 MG/1
300 CAPSULE ORAL 2 TIMES DAILY
Qty: 14 CAPSULE | Refills: 0 | Status: SHIPPED | OUTPATIENT
Start: 2022-08-29 | End: 2022-09-05

## 2022-08-29 RX ORDER — CLOTRIMAZOLE AND BETAMETHASONE DIPROPIONATE 10; .64 MG/G; MG/G
CREAM TOPICAL 2 TIMES DAILY
Qty: 30 G | Refills: 0 | Status: SHIPPED | OUTPATIENT
Start: 2022-08-29

## 2022-08-29 NOTE — PROGRESS NOTES
Assessment     Annual well-woman exam    Vaginal discharge and irritation    BV    Screening for STDs    Patient stopped OCPs on her own        Plan     Clindamycin 300 mg twice daily x7 days for BV    Lotrisone cream twice daily as needed for vaginal itching    Pap smear deferred, last Pap in  normal, next Pap due in    All questions answered  Subjective here for annual exam      Jessica Porter is a 24 y o  female who presents for annual exam  Periods are regular every 28-30 days, lasting 5 days  Dysmenorrhea:mild, occurring throughout menses  Cyclic symptoms include none  No intermenstrual bleeding, spotting, or discharge  The patient reports that there is not domestic violence in her life  Just broke up with her current boyfriend, requesting STD testing  Current contraception: none  History of abnormal Pap smear: no  Family history of uterine or ovarian cancer: no  Regular self breast exam: yes  History of abnormal mammogram: no  Family history of breast cancer: yes -  Maternal grandmother  History of abnormal lipids:  unknown  Menstrual History:  OB History        0    Para   0    Term   0       0    AB   0    Living   0       SAB   0    IAB   0    Ectopic   0    Multiple   0    Live Births   0           Obstetric Comments   Menorrhagia with regular cycle             Menarche age:  17  Patient's last menstrual period was 2022  Review of Systems  Pertinent items are noted in HPI        Objective   No acute distress   /74   Ht 5' 7" (1 702 m)   Wt 81 6 kg (180 lb)   LMP 2022   BMI 28 19 kg/m²     General:   alert and oriented, in no acute distress, alert, appears stated age and cooperative   Heart: regular rate and rhythm, S1, S2 normal, no murmur, click, rub or gallop   Lungs: clear to auscultation bilaterally   Abdomen: soft, non-tender, without masses or organomegaly   Vulva: normal, Bartholin's, Urethra, Cascadia's normal, female escutcheon   Vagina: normal mucosa, normal discharge, thin grey discharge wet mount positive for clue cells consistent with bacterial vaginosis  Cervix: no cervical motion tenderness, no lesions and nulliparous appearance   Uterus: normal size, anteverted, mobile, non-tender, normal shape and consistency   Adnexa: normal adnexa and no mass, fullness, tenderness   Bilateral breast exam in the sitting and supine position with chaperone present, no visible or palpable breast lesions identified  No breast masses noted  No supraclavicular or axillary lymphadenopathy noted  No nipple discharge  Reviewed self-breast exam techniques     Rectal exam,  deferred

## 2022-08-30 LAB
C TRACH DNA SPEC QL NAA+PROBE: NEGATIVE
N GONORRHOEA DNA SPEC QL NAA+PROBE: NEGATIVE

## 2022-09-22 ENCOUNTER — TELEPHONE (OUTPATIENT)
Dept: GYNECOLOGY | Facility: CLINIC | Age: 27
End: 2022-09-22

## 2022-09-23 ENCOUNTER — TELEPHONE (OUTPATIENT)
Dept: GYNECOLOGY | Facility: CLINIC | Age: 27
End: 2022-09-23

## 2022-09-23 DIAGNOSIS — N76.0 BV (BACTERIAL VAGINOSIS): Primary | ICD-10-CM

## 2022-09-23 DIAGNOSIS — B96.89 BV (BACTERIAL VAGINOSIS): Primary | ICD-10-CM

## 2022-09-23 RX ORDER — METRONIDAZOLE 500 MG/1
500 TABLET ORAL EVERY 12 HOURS SCHEDULED
Qty: 14 TABLET | Refills: 0 | Status: SHIPPED | OUTPATIENT
Start: 2022-09-23 | End: 2022-09-30

## 2022-09-23 NOTE — TELEPHONE ENCOUNTER
Patient called c/o took Rx as directed but still has vaginal itching and foul odor  Requests refill of oral Rx sent to [facility] in [facility]

## 2023-01-20 ENCOUNTER — OFFICE VISIT (OUTPATIENT)
Dept: GYNECOLOGY | Facility: CLINIC | Age: 28
End: 2023-01-20

## 2023-01-20 VITALS
WEIGHT: 171 LBS | HEIGHT: 67 IN | SYSTOLIC BLOOD PRESSURE: 106 MMHG | DIASTOLIC BLOOD PRESSURE: 62 MMHG | BODY MASS INDEX: 26.84 KG/M2

## 2023-01-20 DIAGNOSIS — N76.3 SUBACUTE VULVITIS: ICD-10-CM

## 2023-01-20 DIAGNOSIS — B96.89 BACTERIAL VAGINOSIS: Primary | ICD-10-CM

## 2023-01-20 DIAGNOSIS — N76.0 BACTERIAL VAGINOSIS: Primary | ICD-10-CM

## 2023-01-20 DIAGNOSIS — B37.31 CANDIDIASIS OF VAGINA: ICD-10-CM

## 2023-01-20 LAB
BV WHIFF TEST VAG QL: NEGATIVE
CLUE CELLS SPEC QL WET PREP: POSITIVE
PH SMN: 5.5 [PH]
SL AMB POCT WET MOUNT: YES
T VAGINALIS VAG QL WET PREP: NEGATIVE
YEAST VAG QL WET PREP: ABNORMAL

## 2023-01-20 RX ORDER — METRONIDAZOLE 500 MG/1
500 TABLET ORAL 2 TIMES DAILY
Qty: 14 TABLET | Refills: 0 | Status: SHIPPED | OUTPATIENT
Start: 2023-01-20 | End: 2023-01-27

## 2023-01-20 RX ORDER — NYSTATIN AND TRIAMCINOLONE ACETONIDE 100000; 1 [USP'U]/G; MG/G
OINTMENT TOPICAL 2 TIMES DAILY PRN
Qty: 30 G | Refills: 2 | Status: SHIPPED | OUTPATIENT
Start: 2023-01-20

## 2023-01-20 RX ORDER — SPIRONOLACTONE 50 MG/1
50 TABLET, FILM COATED ORAL DAILY
COMMUNITY

## 2023-01-20 RX ORDER — FLUCONAZOLE 150 MG/1
150 TABLET ORAL ONCE
Qty: 2 TABLET | Refills: 0 | Status: SHIPPED | OUTPATIENT
Start: 2023-01-20 | End: 2023-01-20

## 2023-01-20 NOTE — PROGRESS NOTES
Assessment/Plan   Diagnoses and all orders for this visit:    Bacterial vaginosis  -     metroNIDAZOLE (FLAGYL) 500 mg tablet; Take 1 tablet (500 mg total) by mouth 2 (two) times a day for 7 days  -     POCT wet mount    Candidiasis of vagina  -     fluconazole (DIFLUCAN) 150 mg tablet; Take 1 tablet (150 mg total) by mouth once for 1 dose Repeat in 1 week  -     POCT wet mount  -     nystatin-triamcinolone (MYCOLOG-II) ointment; Apply topically 2 (two) times a day as needed (Itching/irritation)    Subacute vulvitis  -     nystatin-triamcinolone (MYCOLOG-II) ointment; Apply topically 2 (two) times a day as needed (Itching/irritation)    Other orders  -     spironolactone (ALDACTONE) 50 mg tablet; Take 50 mg by mouth daily    1  bacterial vaginosis with yeast- noted on exam and wet prep  Patient began having symptoms about 3 weeks ago, took OTC miconazole 3 and then miconazole 1  She was seen by primary doctor with possible UTI, treated with Keflex twice daily for 7 days on 12/16/2022  She continues to note vaginal irritation with odor despite all this  Exam was with slight erythema noted at the labia minora/vestibule area without focal lesions  Wet prep with BV with small amount of yeast   Treatment options were reviewed  Electronic prescription for metronidazole 500 mg twice daily for 7 days was sent to local pharmacy along with fluconazole 150 mg p o  x1 with repeat in 1 week time  Also, Mycolog ointment was sent to use twice daily as needed  Patient was seen August 2022 by Dr Luis Mohr, treated at that time with clindamycin and Lotrisone cream   She does have a long history of vulvar/vaginal irritation  Would suggest we proceed with treatment as noted above  Should she continue with symptoms, would suggest return visit and we can then proceed accordingly with possible long-term treatment for BV or yeast depending on the findings    She did have menstrual bleeding noted today, somewhat limited wet prep analysis  2  contraception-continue Gianvi  3  history of oligomenorrhea/dysmenorrhea/menorrhagia- improved with OCP  4  other- with same partner for a years time, declines STD risks  Follow-up 2023 for yearly exam or as needed  Subjective   Patient ID: Iza Obando is a 24 y o  female  Vitals:    [id number]   BP: 106/62     Patient was seen today for follow-up visit  Please see assessment plan for details        The following portions of the patient's history were reviewed and updated as appropriate: allergies, current medications, past family history, past medical history, past social history, past surgical history and problem list   Past Medical History:   Diagnosis Date   • ADHD (attention deficit hyperactivity disorder), predominantly hyperactive impulsive type    • Concussion    • Dysmenorrhea    • Urinary tract infection      Past Surgical History:   Procedure Laterality Date   • APPENDECTOMY     • TONSILLECTOMY AND ADENOIDECTOMY       OB History    Para Term  AB Living   0 0 0 0 0 0   SAB IAB Ectopic Multiple Live Births   0 0 0 0 0   Obstetric Comments   Menorrhagia with regular cycle        Current Outpatient Medications:   •  fluconazole (DIFLUCAN) 150 mg tablet, Take 1 tablet (150 mg total) by mouth once for 1 dose Repeat in 1 week, Disp: 2 tablet, Rfl: 0  •  metroNIDAZOLE (FLAGYL) 500 mg tablet, Take 1 tablet (500 mg total) by mouth 2 (two) times a day for 7 days, Disp: 14 tablet, Rfl: 0  •  nystatin-triamcinolone (MYCOLOG-II) ointment, Apply topically 2 (two) times a day as needed (Itching/irritation), Disp: 30 g, Rfl: 2  •  spironolactone (ALDACTONE) 50 mg tablet, Take 50 mg by mouth daily, Disp: , Rfl:   •  CLARAVIS 40 MG capsule, Take 40 mg by mouth 2 (two) times a day with meals (Patient not taking: Reported on 2022), Disp: , Rfl: 0  •  clotrimazole-betamethasone (LOTRISONE) 1-0 05 % cream, Apply topically 2 (two) times a day (Patient not taking: Reported on 1/20/2023), Disp: 30 g, Rfl: 0  •  moxifloxacin (VIGAMOX) 0 5 % ophthalmic solution, PUT 2 (TWO) DROPS TO AFFECTED EYE(S) THREE TIMES DAILY (Patient not taking: Reported on 8/29/2022), Disp: , Rfl: 0  •  tobramycin (TOBREX) 0 3 % SOLN, INSTILL 1 DROP IN EACH EYE 4 TIMES A DAY FOR 4 DAYS (Patient not taking: Reported on 8/29/2022), Disp: , Rfl: 0  •  tretinoin (RETIN-A) 0 01 % gel, USE 1 (ONE) APPLICATION(S) AT BEDTIME   APPLY SPARINGLY  (Patient not taking: Reported on 8/29/2022), Disp: , Rfl: 5  •  Vestura 3-0 02 MG per tablet, TAKE 1 TABLET BY MOUTH EVERY DAY (Patient not taking: Reported on 8/29/2022), Disp: 84 tablet, Rfl: 0  No Known Allergies  Social History     Socioeconomic History   • Marital status: Single     Spouse name: None   • Number of children: None   • Years of education: None   • Highest education level: None   Occupational History   • None   Tobacco Use   • Smoking status: Never   • Smokeless tobacco: Never   Vaping Use   • Vaping Use: Never used   Substance and Sexual Activity   • Alcohol use: Yes     Comment: Socially   • Drug use: No   • Sexual activity: Yes     Partners: Male     Birth control/protection: Pill, Condom   Other Topics Concern   • None   Social History Narrative    Always wears seat belt     Caffeine use     No preference on Protestant beliefs      Social Determinants of Health     Financial Resource Strain: Not on file   Food Insecurity: Not on file   Transportation Needs: Not on file   Physical Activity: Not on file   Stress: Not on file   Social Connections: Not on file   Intimate Partner Violence: Not on file   Housing Stability: Not on file     Family History   Problem Relation Age of Onset   • Hypertension Father    • Asthma Maternal Grandmother    • Breast cancer Maternal Grandmother    • Ovarian cysts Maternal Grandmother    • Ovarian cysts Mother    • Ovarian cysts Paternal Aunt        Review of Systems   Constitutional: Negative for chills, diaphoresis, fatigue and fever    Respiratory: Negative for apnea, cough, chest tightness, shortness of breath and wheezing  Cardiovascular: Negative for chest pain, palpitations and leg swelling  Gastrointestinal: Negative for abdominal distention, abdominal pain, anal bleeding, constipation, diarrhea, nausea, rectal pain and vomiting  Genitourinary: Positive for vaginal discharge  Negative for difficulty urinating, dyspareunia, dysuria, frequency, hematuria, menstrual problem, pelvic pain, urgency, vaginal bleeding and vaginal pain  Musculoskeletal: Negative for arthralgias, back pain and myalgias  Skin: Negative for color change and rash  Neurological: Negative for dizziness, syncope, light-headedness, numbness and headaches  Hematological: Negative for adenopathy  Does not bruise/bleed easily  Psychiatric/Behavioral: Negative for dysphoric mood and sleep disturbance  The patient is not nervous/anxious  Vulvar irritation, odor  Objective   Physical Exam  OBGyn Exam     Objective      /62 (BP Location: Left arm, Patient Position: Sitting, Cuff Size: Adult)   Ht 5' 7" (1 702 m)   Wt 77 6 kg (171 lb)   BMI 26 78 kg/m²     General:   alert and oriented, in no acute distress   Neck:    Breast:    Heart:    Lungs:    Abdomen: soft, non-tender, without masses or organomegaly   Vulva:  Mild erythema to bilateral labia minora, without focal lesions or raised areas  Vagina:  Small amount of menstrual blood, small white discharge, that erythema or lesions or odor noted  Cervix:  Small amount of menstrual blood, without lesions or cervicitis  No CMT     Uterus: normal size, anteverted, non-tender   Adnexa: no mass, fullness, tenderness   Rectum: deferred    Psych:  Normal mood and affect   Skin:  Without obvious lesions   Eyes: symmetric, with normal movements and reactivity   Musculoskeletal:  Normal muscle tone and movements appreciated

## 2023-03-22 ENCOUNTER — TELEPHONE (OUTPATIENT)
Dept: GYNECOLOGY | Facility: CLINIC | Age: 28
End: 2023-03-22

## 2023-03-22 DIAGNOSIS — L70.9 ADULT ACNE: ICD-10-CM

## 2023-03-22 DIAGNOSIS — Z30.011 ENCOUNTER FOR INITIAL PRESCRIPTION OF CONTRACEPTIVE PILLS: Primary | ICD-10-CM

## 2023-03-22 RX ORDER — NORGESTIMATE AND ETHINYL ESTRADIOL 7DAYSX3 LO
1 KIT ORAL DAILY
Qty: 84 TABLET | Refills: 1 | Status: SHIPPED | OUTPATIENT
Start: 2023-03-22 | End: 2023-06-12 | Stop reason: SDUPTHER

## 2023-03-22 NOTE — TELEPHONE ENCOUNTER
Patient called requesting Rx for BCPs  She wants to start taking them again because she will be starting Accutane soon  Her LMP was 2/20/22  Her cycle is due to start any day  She remembers all instructions for BCP use  She is fine with taking the same pill she took before  She uses [facility] in [place]

## 2023-03-22 NOTE — TELEPHONE ENCOUNTER
Left detailed message to inform patient  Advised call back to schedule 3 month pill check appointment

## 2023-04-05 ENCOUNTER — TELEPHONE (OUTPATIENT)
Dept: GYNECOLOGY | Facility: CLINIC | Age: 28
End: 2023-04-05

## 2023-04-05 NOTE — TELEPHONE ENCOUNTER
Called patient to inform, completed patient's contraception form , faxed back to [facility], scanned into patient's chart, mailed original to patient

## 2023-05-18 ENCOUNTER — AMB VIDEO VISIT (OUTPATIENT)
Dept: OTHER | Facility: HOSPITAL | Age: 28
End: 2023-05-18

## 2023-05-18 DIAGNOSIS — B37.9 YEAST INFECTION: Primary | ICD-10-CM

## 2023-05-18 RX ORDER — FLUCONAZOLE 150 MG/1
150 TABLET ORAL ONCE
Qty: 1 TABLET | Refills: 1 | Status: SHIPPED | OUTPATIENT
Start: 2023-05-18 | End: 2023-05-18

## 2023-05-18 RX ORDER — FLUCONAZOLE 150 MG/1
150 TABLET ORAL ONCE
Qty: 1 TABLET | Refills: 1 | Status: SHIPPED | OUTPATIENT
Start: 2023-05-18 | End: 2023-05-18 | Stop reason: CLARIF

## 2023-05-18 NOTE — PATIENT INSTRUCTIONS
Will treat for yeast infection based on whitish discharge and itching  Will send for UA and treated with antibiotic if needed  Follow up with ob/gyn  Got to ER with any worsening symptoms  Yeast Infection   WHAT YOU NEED TO KNOW:   A yeast infection, or vaginal candidiasis, is a common vaginal infection  A yeast infection is caused by a fungus, or yeast-like germ  Fungi are normally found in your vagina  Too many fungi can cause an infection  DISCHARGE INSTRUCTIONS:   Call your doctor or gynecologist if:   You have a fever and chills  You develop abdominal or pelvic pain  Your discharge is bloody and it is not your monthly period  Your signs and symptoms get worse, even after treatment  You have questions or concerns about your condition or care  Medicines:   Medicines  help treat the fungal infection and decrease inflammation  The medicine may be a pill, cream, ointment, or vaginal tablet or suppository  Take your medicine as directed  Contact your healthcare provider if you think your medicine is not helping or if you have side effects  Tell your provider if you are allergic to any medicine  Keep a list of the medicines, vitamins, and herbs you take  Include the amounts, and when and why you take them  Bring the list or the pill bottles to follow-up visits  Carry your medicine list with you in case of an emergency  Keep your vagina healthy:   Clean your genital area with mild soap and warm water each day  Do not get soap inside your vagina  Gently dry the area after washing  Do not use hot tubs  The heat and moisture from hot tubs can increase your risk for another yeast infection  Always wipe from front to back  after you use the toilet  This prevents spreading bacteria from your rectal area into your vagina  Do not wear tight-fitting clothes or undergarments  for long periods of time  Wear cotton underwear during the day   Cotton helps keep your genital area dry and does not hold in warmth or moisture  Do not wear underwear at night  Do not douche  or use feminine hygiene sprays or bubble bath  Do not use pads or tampons that are scented, or colored or perfumed toilet paper  Do not have sex until your symptoms go away  Have your partner wear a condom until you complete your course of medication  Ask your healthcare provider about birth control options if necessary  Condoms have latex and diaphragms have gel that kills sperm  Both of these may irritate your genital area  Follow up with your doctor or gynecologist as directed:  Write down your questions so you remember to ask them during your visits  © Copyright [employer] 2022 Information is for End User's use only and may not be sold, redistributed or otherwise used for commercial purposes  The above information is an  only  It is not intended as medical advice for individual conditions or treatments  Talk to your doctor, nurse or pharmacist before following any medical regimen to see if it is safe and effective for you

## 2023-05-18 NOTE — PROGRESS NOTES
Video Visit - Marisel Aragon 34 y o  female MRN: [id number]    REQUIRED DOCUMENTATION:         1  This service was [facility] via [facility]  2  Provider located at [address] [place] [id number] [phone]  3  [facility] provider: ERIK Al  4  Identify all parties in room with patient during [facility] visit:  Patient   5  After connecting through [employer], patient was identified by name and date of birth  Patient was then informed that this was a Telemedicine visit and that the exam was being conducted confidentially over secure lines  My office door was closed  No one else was in the room  Patient acknowledged consent and understanding of privacy and security of the Telemedicine visit  I informed the patient that I have reviewed their record in Epic and presented the opportunity for them to ask any questions regarding the visit today  The patient agreed to participate  This is a 34year old female here today for video visit  She states she thinks she has UTI  She has had symptoms for about 3 days  She denies any fevers  She denies any abd or back pain  She is having pressure, frequency and some itchiness  She states her last UTI was about 1 month ago  She is having whitish discharge  She also notes she has history of having excessive yeast   It was recommended she start cranberry juice and wash but this has not helped  Review of Systems   Constitutional: Negative  Genitourinary: Positive for dysuria, frequency, urgency and vaginal discharge  Neurological: Negative  Psychiatric/Behavioral: Negative  There were no vitals filed for this visit  Physical Exam  Constitutional:       General: She is not in acute distress  Appearance: Normal appearance  She is not ill-appearing or toxic-appearing  Abdominal:      Tenderness: There is no abdominal tenderness     Neurological:      Mental Status: She is alert and oriented to person, place, and time  Psychiatric:         Mood and Affect: Mood normal          Behavior: Behavior normal          Thought Content: Thought content normal          Judgment: Judgment normal        Diagnoses and all orders for this visit:    Yeast infection  -     UA w Reflex to Microscopic w Reflex to Culture -Lab Collect; Future  -     fluconazole (DIFLUCAN) 150 mg tablet; Take 1 tablet (150 mg total) by mouth once for 1 dose Repeat in 3 days if symptoms continue      Patient Instructions   Will treat for yeast infection based on whitish discharge and itching  Will send for UA and treated with antibiotic if needed  Follow up with ob/gyn  Got to ER with any worsening symptoms  Yeast Infection   WHAT YOU NEED TO KNOW:   A yeast infection, or vaginal candidiasis, is a common vaginal infection  A yeast infection is caused by a fungus, or yeast-like germ  Fungi are normally found in your vagina  Too many fungi can cause an infection  DISCHARGE INSTRUCTIONS:   Call your doctor or gynecologist if:   • You have a fever and chills  • You develop abdominal or pelvic pain  • Your discharge is bloody and it is not your monthly period  • Your signs and symptoms get worse, even after treatment  • You have questions or concerns about your condition or care  Medicines:   • Medicines  help treat the fungal infection and decrease inflammation  The medicine may be a pill, cream, ointment, or vaginal tablet or suppository  • Take your medicine as directed  Contact your healthcare provider if you think your medicine is not helping or if you have side effects  Tell your provider if you are allergic to any medicine  Keep a list of the medicines, vitamins, and herbs you take  Include the amounts, and when and why you take them  Bring the list or the pill bottles to follow-up visits  Carry your medicine list with you in case of an emergency      Keep your vagina healthy:   • Clean your genital area with mild soap and warm water each day  Do not get soap inside your vagina  Gently dry the area after washing  Do not use hot tubs  The heat and moisture from hot tubs can increase your risk for another yeast infection  • Always wipe from front to back  after you use the toilet  This prevents spreading bacteria from your rectal area into your vagina  • Do not wear tight-fitting clothes or undergarments  for long periods of time  Wear cotton underwear during the day  Cotton helps keep your genital area dry and does not hold in warmth or moisture  Do not wear underwear at night  • Do not douche  or use feminine hygiene sprays or bubble bath  Do not use pads or tampons that are scented, or colored or perfumed toilet paper  • Do not have sex until your symptoms go away  Have your partner wear a condom until you complete your course of medication  • Ask your healthcare provider about birth control options if necessary  Condoms have latex and diaphragms have gel that kills sperm  Both of these may irritate your genital area  Follow up with your doctor or gynecologist as directed:  Write down your questions so you remember to ask them during your visits  © Copyright [facility] 2022 Information is for End User's use only and may not be sold, redistributed or otherwise used for commercial purposes  The above information is an  only  It is not intended as medical advice for individual conditions or treatments  Talk to your doctor, nurse or pharmacist before following any medical regimen to see if it is safe and effective for you  Follow up with PCP if not improved, if symptoms are worse, go to the ER

## 2023-05-18 NOTE — CARE ANYWHERE EVISITS
Visit Summary for [facility] - Gender: Female - Date of Birth: [id number]  Date: [id number] - Duration: 6 minutes  Patient: [facility]  Provider: Tiffanie VALENCIA    Patient Contact Information  Address  [address] [place] [address]; [address]  [phone]    Visit Topics  Bladder Infection/UTI [Added By: Self - 2023-05-18]    Triage Questions   What is your current physical address in the event of a medical emergency? Answer []  Are you allergic to any medications? Answer []  Are you now or could you be pregnant? Answer []  Do you have any immune system compromise or chronic lung   disease? Answer []  Do you have any vulnerable family members in the home (infant, pregnant, cancer, elderly)? Answer []     Conversation Transcripts  [0A][0A] [Notification] You are connected with Chi Peters [address] [place] [address] is located in [place]  [0A][Notification] [facility] has shared health history  Alonso Oneill  [0A]    Diagnosis  Candidiasis, unspecified    Procedures  Value: 10494 Code: CPT-4 UNLISTED E&M SERVICE    Medications Prescribed    No prescriptions ordered    Electronically signed by: Tonya Rivera(NPI [id number])

## 2023-05-19 ENCOUNTER — AMB VIDEO VISIT (OUTPATIENT)
Dept: OTHER | Facility: HOSPITAL | Age: 28
End: 2023-05-19

## 2023-05-19 NOTE — CARE ANYWHERE EVISITS
Visit Summary for [facility] - Gender: Female - Date of Birth: [id number]  Date: [id number] - Duration: 3 minutes  [facility]  Provider: Rodolfo VALENCIA    Patient Contact Information  Address  [address] [place] [address]; [address]  [phone]    Visit Topics  Just had visit, RX not called in yet [Added By: Self - 2023-05-19]    Triage Questions   What is your current physical address in the event of a medical emergency? Answer []  Are you allergic to any medications? Answer []  Are you now or could you be pregnant? Answer []  Do you have any immune system compromise or chronic lung   disease? Answer []  Do you have any vulnerable family members in the home (infant, pregnant, cancer, elderly)? Answer []     Conversation Transcripts  [0A][0A] [Notification] You are connected with Tonya Tamez, [facility] [address] is located in [place]  [0A][Notification] [facility] has shared health history  Rylandmazin Burtonas  [0A]    Diagnosis    Procedures  Value: 22482 Code: CPT-4 UNLISTED E&M SERVICE    Medications Prescribed    No prescriptions ordered    Electronically signed by: Tonya Palacio(NPI [id number])

## 2023-05-24 ENCOUNTER — AMB VIDEO VISIT (OUTPATIENT)
Dept: OTHER | Facility: HOSPITAL | Age: 28
End: 2023-05-24

## 2023-05-24 DIAGNOSIS — N76.3 SUBACUTE VULVITIS: ICD-10-CM

## 2023-05-24 DIAGNOSIS — B37.31 CANDIDIASIS OF VAGINA: ICD-10-CM

## 2023-05-24 DIAGNOSIS — B96.89 BACTERIAL VAGINOSIS: Primary | ICD-10-CM

## 2023-05-24 DIAGNOSIS — N76.0 BACTERIAL VAGINOSIS: Primary | ICD-10-CM

## 2023-05-24 RX ORDER — METRONIDAZOLE 500 MG/1
500 TABLET ORAL EVERY 12 HOURS SCHEDULED
Qty: 14 TABLET | Refills: 0 | Status: SHIPPED | OUTPATIENT
Start: 2023-05-24 | End: 2023-05-31

## 2023-05-24 RX ORDER — FLUCONAZOLE 150 MG/1
TABLET ORAL
Qty: 2 TABLET | Refills: 0 | Status: SHIPPED | OUTPATIENT
Start: 2023-05-24 | End: 2023-05-27

## 2023-05-24 RX ORDER — NYSTATIN AND TRIAMCINOLONE ACETONIDE 100000; 1 [USP'U]/G; MG/G
OINTMENT TOPICAL 2 TIMES DAILY PRN
Qty: 30 G | Refills: 2 | Status: SHIPPED | OUTPATIENT
Start: 2023-05-24

## 2023-05-24 NOTE — PROGRESS NOTES
Video Visit - Christophe Artis 34 y o  female MRN: [id number]    REQUIRED DOCUMENTATION:         1  This service was provided via [facility]  2  Provider located at [address] [place] [id number] [phone]  3  [facility] provider: Nikhil Frost PA-C   4  Identify all parties in room with patient during [facility] visit:  No one else  5  After connecting through [employer], patient was identified by name and date of birth  Patient was then informed that this was a Telemedicine visit and that the exam was being conducted confidentially over secure lines  My office door was closed  No one else was in the room  Patient acknowledged consent and understanding of privacy and security of the Telemedicine visit  I informed the patient that I have reviewed their record in Epic and presented the opportunity for them to ask any questions regarding the visit today  The patient agreed to participate  HPI  Patient states that she had a video visit on 5/18 with some yeast infection/bladder infection issues  She is still having these issues  She had tried the fluconazole  And used otc 3 days miconazole and another 3 day antifungal   She states it is alittle better  She is still having itching, white discharge, odor  She is having frequency of urination and rush of bladder pressure, itching  She has been using nystatin cream as well  Review of Systems   Constitutional: Negative  HENT: Negative  Respiratory: Negative  Cardiovascular: Negative  Gastrointestinal: Negative  Musculoskeletal: Negative  Skin: Negative  Neurological: Negative  Psychiatric/Behavioral: Negative  Physical Exam  Constitutional:       General: She is not in acute distress  Appearance: Normal appearance  She is not ill-appearing, toxic-appearing or diaphoretic  HENT:      Head: Normocephalic and atraumatic     Pulmonary:      Effort: Pulmonary effort is normal    Skin: General: Skin is warm and dry  Neurological:      General: No focal deficit present  Mental Status: She is alert and oriented to person, place, and time  Psychiatric:         Mood and Affect: Mood normal          Behavior: Behavior normal          Diagnoses and all orders for this visit:    Bacterial vaginosis  -     metroNIDAZOLE (FLAGYL) 500 mg tablet; Take 1 tablet (500 mg total) by mouth every 12 (twelve) hours for 7 days    Candidiasis of vagina  -     nystatin-triamcinolone (MYCOLOG-II) ointment; Apply topically 2 (two) times a day as needed (Itching/irritation)  -     fluconazole (DIFLUCAN) 150 mg tablet; Take one tablet by mouth for a one time dose  Repeat in 3 days if needed    Subacute vulvitis  -     nystatin-triamcinolone (MYCOLOG-II) ointment; Apply topically 2 (two) times a day as needed (Itching/irritation)    will repeat dose of diflucan  Will also treat for possible BV as patient has a history of this in the past as well  She will go for UA to rule out UTI  If no improvement follow up with OBGYN  Er if worsen  Patient Instructions     Yeast Infection   WHAT YOU NEED TO KNOW:   A yeast infection, or vaginal candidiasis, is a common vaginal infection  A yeast infection is caused by a fungus, or yeast-like germ  Fungi are normally found in your vagina  Too many fungi can cause an infection  DISCHARGE INSTRUCTIONS:   Call your doctor or gynecologist if:   • You have a fever and chills  • You develop abdominal or pelvic pain  • Your discharge is bloody and it is not your monthly period  • Your signs and symptoms get worse, even after treatment  • You have questions or concerns about your condition or care  Medicines:   • Medicines  help treat the fungal infection and decrease inflammation  The medicine may be a pill, cream, ointment, or vaginal tablet or suppository  • Take your medicine as directed    Contact your healthcare provider if you think your medicine is not helping or if you have side effects  Tell your provider if you are allergic to any medicine  Keep a list of the medicines, vitamins, and herbs you take  Include the amounts, and when and why you take them  Bring the list or the pill bottles to follow-up visits  Carry your medicine list with you in case of an emergency  Keep your vagina healthy:   • Clean your genital area with mild soap and warm water each day  Do not get soap inside your vagina  Gently dry the area after washing  Do not use hot tubs  The heat and moisture from hot tubs can increase your risk for another yeast infection  • Always wipe from front to back  after you use the toilet  This prevents spreading bacteria from your rectal area into your vagina  • Do not wear tight-fitting clothes or undergarments  for long periods of time  Wear cotton underwear during the day  Cotton helps keep your genital area dry and does not hold in warmth or moisture  Do not wear underwear at night  • Do not douche  or use feminine hygiene sprays or bubble bath  Do not use pads or tampons that are scented, or colored or perfumed toilet paper  • Do not have sex until your symptoms go away  Have your partner wear a condom until you complete your course of medication  • Ask your healthcare provider about birth control options if necessary  Condoms have latex and diaphragms have gel that kills sperm  Both of these may irritate your genital area  Follow up with your doctor or gynecologist as directed:  Write down your questions so you remember to ask them during your visits  © Copyright [employer] 2022 Information is for End User's use only and may not be sold, redistributed or otherwise used for commercial purposes  The above information is an  only  It is not intended as medical advice for individual conditions or treatments   Talk to your doctor, nurse or pharmacist before following any medical regimen to see if it is safe and effective for you  Bacterial Vaginosis   WHAT YOU NEED TO KNOW:   Bacterial vaginosis is an infection in the vagina  It may cause vaginitis (irritation and inflammation of the vagina)  The cause is not known  Bacteria normally found in the vagina are imbalanced  Your risk increases if you are sexually active, you use a douche, or you have an intrauterine device (IUD)  DISCHARGE INSTRUCTIONS:   Call your doctor or gynecologist if:   • Your symptoms come back or do not improve with treatment  • You have vaginal bleeding that is not your monthly period  • You have questions or concerns about your condition or care  Medicines:   • Antibiotics  are given to kill the bacteria  They may be given as a pill or a cream to put in your vagina  • Take your medicine as directed  Contact your healthcare provider if you think your medicine is not helping or if you have side effects  Tell your provider if you are allergic to any medicine  Keep a list of the medicines, vitamins, and herbs you take  Include the amounts, and when and why you take them  Bring the list or the pill bottles to follow-up visits  Carry your medicine list with you in case of an emergency  Bacterial vaginosis and pregnancy:  If you have bacterial vaginosis during pregnancy, your baby may be born early or have a low birth weight  Your healthcare provider may recommend testing for bacterial vaginosis before or during your pregnancy  He or she will talk to you about your risk for premature delivery, and make sure you know the benefits and risks of testing  Prevent bacterial vaginosis:   • Keep your vaginal area clean and dry  Wear underwear and pantyhose with a cotton crotch  Wipe from front to back after you urinate or have a bowel movement  After you bathe, rinse soap from your vaginal area to decrease your risk for irritation  • Do not use products that cause irritation  Always use unscented tampons or sanitary pads   Do not use feminine sprays, powders, or scented tampons  They may cause irritation and increase your risk for vaginosis  Detergents and fabric softeners may also cause irritation  • Do not use a douche  This can cause an imbalance in healthy vaginal bacteria  • Use latex condoms during sex  This helps prevent another infection and keeps your partner from getting the infection  Follow up with your doctor or gynecologist as directed: Bacterial vaginosis increases the risk for several health problems, such as pelvic inflammatory disease (PID) or sexually transmitted infections  Work with your healthcare providers to schedule regular appointments to check for health problems  Write down your questions so you remember to ask them during your visits  © Copyright [employer] 2022 Information is for End User's use only and may not be sold, redistributed or otherwise used for commercial purposes  The above information is an  only  It is not intended as medical advice for individual conditions or treatments  Talk to your doctor, nurse or pharmacist before following any medical regimen to see if it is safe and effective for you

## 2023-05-24 NOTE — CARE ANYWHERE EVISITS
Visit Summary for Amina Henderson - Gender: Female - Date of Birth: [id number]  Date: [id number] - Duration: 9 minutes  Patient: Amina Henderson  Provider: Holly Esquivel PA-C    Patient Contact Information  Address  [address] [place] [address]; [address]  [phone]    Visit Topics  Still experiencing bladder issues/yeast infection issues despite meeting with someone a few days ago  [Added Alison Leija - [id number]]    Triage Questions   What is your current physical address in the event of a medical emergency? Answer []  Are you allergic to any medications? Answer []  Are you now or could you be pregnant? Answer []  Do you have any immune system compromise or chronic lung   disease? Answer []  Do you have any vulnerable family members in the home (infant, pregnant, cancer, elderly)? Answer []     Conversation Transcripts  [0A][0A] [Notification] You are connected with Holly Esquivel PA-C, [facility] [address] [facility] is located in [place]  [0A][Notification] Amina Henderson has shared health history  Li Farias  [0A]    Diagnosis  Acute candidiasis of vulva and vagina  Subacute and chronic vulvitis  Acute vaginitis    Procedures  Value: 74587 Code: CPT-4 UNLISTED E&M SERVICE    Medications Prescribed    No prescriptions ordered    Electronically signed by: Magalis Arellano(NPI [id number])

## 2023-05-24 NOTE — PATIENT INSTRUCTIONS
Yeast Infection   WHAT YOU NEED TO KNOW:   A yeast infection, or vaginal candidiasis, is a common vaginal infection  A yeast infection is caused by a fungus, or yeast-like germ  Fungi are normally found in your vagina  Too many fungi can cause an infection  DISCHARGE INSTRUCTIONS:   Call your doctor or gynecologist if:   You have a fever and chills  You develop abdominal or pelvic pain  Your discharge is bloody and it is not your monthly period  Your signs and symptoms get worse, even after treatment  You have questions or concerns about your condition or care  Medicines:   Medicines  help treat the fungal infection and decrease inflammation  The medicine may be a pill, cream, ointment, or vaginal tablet or suppository  Take your medicine as directed  Contact your healthcare provider if you think your medicine is not helping or if you have side effects  Tell your provider if you are allergic to any medicine  Keep a list of the medicines, vitamins, and herbs you take  Include the amounts, and when and why you take them  Bring the list or the pill bottles to follow-up visits  Carry your medicine list with you in case of an emergency  Keep your vagina healthy:   Clean your genital area with mild soap and warm water each day  Do not get soap inside your vagina  Gently dry the area after washing  Do not use hot tubs  The heat and moisture from hot tubs can increase your risk for another yeast infection  Always wipe from front to back  after you use the toilet  This prevents spreading bacteria from your rectal area into your vagina  Do not wear tight-fitting clothes or undergarments  for long periods of time  Wear cotton underwear during the day  Cotton helps keep your genital area dry and does not hold in warmth or moisture  Do not wear underwear at night  Do not douche  or use feminine hygiene sprays or bubble bath   Do not use pads or tampons that are scented, or colored or perfumed toilet paper  Do not have sex until your symptoms go away  Have your partner wear a condom until you complete your course of medication  Ask your healthcare provider about birth control options if necessary  Condoms have latex and diaphragms have gel that kills sperm  Both of these may irritate your genital area  Follow up with your doctor or gynecologist as directed:  Write down your questions so you remember to ask them during your visits  © Copyright Radha Fails 2022 Information is for End User's use only and may not be sold, redistributed or otherwise used for commercial purposes  The above information is an  only  It is not intended as medical advice for individual conditions or treatments  Talk to your doctor, nurse or pharmacist before following any medical regimen to see if it is safe and effective for you  Bacterial Vaginosis   WHAT YOU NEED TO KNOW:   Bacterial vaginosis is an infection in the vagina  It may cause vaginitis (irritation and inflammation of the vagina)  The cause is not known  Bacteria normally found in the vagina are imbalanced  Your risk increases if you are sexually active, you use a douche, or you have an intrauterine device (IUD)  DISCHARGE INSTRUCTIONS:   Call your doctor or gynecologist if:   Your symptoms come back or do not improve with treatment  You have vaginal bleeding that is not your monthly period  You have questions or concerns about your condition or care  Medicines:   Antibiotics  are given to kill the bacteria  They may be given as a pill or a cream to put in your vagina  Take your medicine as directed  Contact your healthcare provider if you think your medicine is not helping or if you have side effects  Tell your provider if you are allergic to any medicine  Keep a list of the medicines, vitamins, and herbs you take  Include the amounts, and when and why you take them  Bring the list or the pill bottles to follow-up visits  Carry your medicine list with you in case of an emergency  Bacterial vaginosis and pregnancy:  If you have bacterial vaginosis during pregnancy, your baby may be born early or have a low birth weight  Your healthcare provider may recommend testing for bacterial vaginosis before or during your pregnancy  He or she will talk to you about your risk for premature delivery, and make sure you know the benefits and risks of testing  Prevent bacterial vaginosis:   Keep your vaginal area clean and dry  Wear underwear and pantyhose with a cotton crotch  Wipe from front to back after you urinate or have a bowel movement  After you bathe, rinse soap from your vaginal area to decrease your risk for irritation  Do not use products that cause irritation  Always use unscented tampons or sanitary pads  Do not use feminine sprays, powders, or scented tampons  They may cause irritation and increase your risk for vaginosis  Detergents and fabric softeners may also cause irritation  Do not use a douche  This can cause an imbalance in healthy vaginal bacteria  Use latex condoms during sex  This helps prevent another infection and keeps your partner from getting the infection  Follow up with your doctor or gynecologist as directed: Bacterial vaginosis increases the risk for several health problems, such as pelvic inflammatory disease (PID) or sexually transmitted infections  Work with your healthcare providers to schedule regular appointments to check for health problems  Write down your questions so you remember to ask them during your visits  © Copyright Cheri Porras 2022 Information is for End User's use only and may not be sold, redistributed or otherwise used for commercial purposes  The above information is an  only  It is not intended as medical advice for individual conditions or treatments   Talk to your doctor, nurse or pharmacist before following any medical regimen to see if it is safe and effective for you

## 2023-05-31 ENCOUNTER — OFFICE VISIT (OUTPATIENT)
Dept: GYNECOLOGY | Facility: CLINIC | Age: 28
End: 2023-05-31

## 2023-05-31 VITALS
SYSTOLIC BLOOD PRESSURE: 120 MMHG | DIASTOLIC BLOOD PRESSURE: 86 MMHG | HEIGHT: 67 IN | WEIGHT: 190.8 LBS | BODY MASS INDEX: 29.95 KG/M2

## 2023-05-31 DIAGNOSIS — N76.0 BV (BACTERIAL VAGINOSIS): ICD-10-CM

## 2023-05-31 DIAGNOSIS — R10.2 PELVIC PAIN: ICD-10-CM

## 2023-05-31 DIAGNOSIS — B96.89 BV (BACTERIAL VAGINOSIS): ICD-10-CM

## 2023-05-31 DIAGNOSIS — R39.9 UTI SYMPTOMS: ICD-10-CM

## 2023-05-31 DIAGNOSIS — Z11.3 SCREENING FOR STD (SEXUALLY TRANSMITTED DISEASE): ICD-10-CM

## 2023-05-31 DIAGNOSIS — R10.2 PELVIC PAIN IN FEMALE: ICD-10-CM

## 2023-05-31 DIAGNOSIS — N92.6 IRREGULAR MENSTRUATION: ICD-10-CM

## 2023-05-31 LAB
BACTERIA UR QL AUTO: ABNORMAL /HPF
BILIRUB UR QL STRIP: NEGATIVE
CLARITY UR: ABNORMAL
COLOR UR: ABNORMAL
EXTERNAL HIV SCREEN: NORMAL
GLUCOSE UR STRIP-MCNC: NEGATIVE MG/DL
HCV AB SER-ACNC: NEGATIVE
HGB UR QL STRIP.AUTO: NEGATIVE
KETONES UR STRIP-MCNC: NEGATIVE MG/DL
LEUKOCYTE ESTERASE UR QL STRIP: ABNORMAL
NITRITE UR QL STRIP: NEGATIVE
NON-SQ EPI CELLS URNS QL MICRO: ABNORMAL /HPF
PH UR STRIP.AUTO: 7 [PH]
PROT UR STRIP-MCNC: ABNORMAL MG/DL
RBC #/AREA URNS AUTO: ABNORMAL /HPF
SL AMB  POCT GLUCOSE, UA: ABNORMAL
SL AMB LEUKOCYTE ESTERASE,UA: 1
SL AMB POCT BILIRUBIN,UA: ABNORMAL
SL AMB POCT BLOOD,UA: ABNORMAL
SL AMB POCT CLARITY,UA: ABNORMAL
SL AMB POCT COLOR,UA: YELLOW
SL AMB POCT KETONES,UA: ABNORMAL
SL AMB POCT NITRITE,UA: ABNORMAL
SL AMB POCT PH,UA: 6.5
SL AMB POCT SPECIFIC GRAVITY,UA: 1.01
SL AMB POCT URINE PROTEIN: ABNORMAL
SL AMB POCT UROBILINOGEN: 0.2
SP GR UR STRIP.AUTO: 1.02 (ref 1–1.03)
UROBILINOGEN UR STRIP-ACNC: <2 MG/DL
WBC #/AREA URNS AUTO: ABNORMAL /HPF

## 2023-05-31 PROCEDURE — 87491 CHLMYD TRACH DNA AMP PROBE: CPT | Performed by: OBSTETRICS & GYNECOLOGY

## 2023-05-31 PROCEDURE — 87591 N.GONORRHOEAE DNA AMP PROB: CPT | Performed by: OBSTETRICS & GYNECOLOGY

## 2023-05-31 PROCEDURE — 87086 URINE CULTURE/COLONY COUNT: CPT | Performed by: OBSTETRICS & GYNECOLOGY

## 2023-05-31 PROCEDURE — 81001 URINALYSIS AUTO W/SCOPE: CPT | Performed by: OBSTETRICS & GYNECOLOGY

## 2023-05-31 RX ORDER — IBUPROFEN 600 MG/1
600 TABLET ORAL EVERY 6 HOURS PRN
Qty: 30 TABLET | Refills: 0 | Status: SHIPPED | OUTPATIENT
Start: 2023-05-31

## 2023-05-31 NOTE — PROGRESS NOTES
Assessment/Plan:    Diagnoses and all orders for this visit:    Pelvic pain in female  -     US pelvis complete non OB; Future  -     ibuprofen (MOTRIN) 600 mg tablet; Take 1 tablet (600 mg total) by mouth every 6 (six) hours as needed for mild pain    UTI symptoms  -     POCT urine dip  -     Urinalysis with microscopic  -     Urine culture  -     US pelvis complete non OB; Future    Pelvic pain  -     Chlamydia/GC amplified DNA by PCR  -     CBC and Platelet; Future  -     hCG, quantitative; Future    Screening for STD (sexually transmitted disease)  -     Chlamydia/GC amplified DNA by PCR  -     Hepatitis B surface antigen; Future  -     Hepatitis C antibody; Future  -     RPR-Syphilis Screening (Total Syphilis IGG/IGM); Future  -     HIV-1 RNA, quantitative, PCR; Future    Irregular menstruation  -     CBC and Platelet; Future  -     TSH, 3rd generation with Free T4 reflex; Future    BV (bacterial vaginosis)        Subjective: Pelvic pain, dysuria     Patient ID: Salud Crump is a 34 y o  female  HPI   28-year-old female, nulliparous recently evaluated by another provider for vaginal discharge and irritation with itching  Was started on metronidazole and Mycolog cream   The symptoms have improved somewhat  She is also concerned about dysuria symptoms and general pelvic pain symptoms  Denies dyspareunia  Is on Retin-A for treatment of adult acne  States her pelvic pain occasionally wakes her up at night and keeps her up  Screening laboratory studies ordered, pelvic ultrasound ordered, follow-up within the next 2 to 3 weeks  Clean-catch UA C&S sent for evaluation prescription for ibuprofen given to patient  Review of Systems   Respiratory: Negative  Cardiovascular: Negative  Gastrointestinal: Positive for nausea  Genitourinary: Positive for dysuria, menstrual problem, pelvic pain and vaginal discharge  Neurological: Negative          Objective: No acute distress  /86 (BP Location: "Right arm, Patient Position: Sitting, Cuff Size: Standard)   Ht 5' 7\" (1 702 m)   Wt 86 5 kg (190 lb 12 8 oz)   LMP 04/21/2023 (Approximate)   BMI 29 88 kg/m²      Physical Exam  Vitals and nursing note reviewed  Exam conducted with a chaperone present  Constitutional:       Appearance: Normal appearance  She is obese  HENT:      Head: Normocephalic  Eyes:      Pupils: Pupils are equal, round, and reactive to light  Pulmonary:      Effort: Pulmonary effort is normal    Abdominal:      General: Abdomen is flat  Genitourinary:     General: Normal vulva  Comments: Normal external genitalia  Normal size uterus  Normal cervix  No CMT  No pelvic masses  Yellow vaginal discharge, wet mount positive for clue cells consistent with BV  Musculoskeletal:         General: Normal range of motion  Cervical back: Normal range of motion  Skin:     General: Skin is warm and dry  Neurological:      Mental Status: She is alert and oriented to person, place, and time  Psychiatric:         Mood and Affect: Mood normal          Behavior: Behavior normal          Thought Content: Thought content normal          Judgment: Judgment normal         Please note    In addition to the time spent discussing the findings and results of today's visit and exam, I spent approximately 30 minutes of face-to-face time with the patient, greater than 50% of which was spent in counseling and coordination of care for this patient    "

## 2023-06-01 ENCOUNTER — AMB VIDEO VISIT (OUTPATIENT)
Dept: OTHER | Facility: HOSPITAL | Age: 28
End: 2023-06-01

## 2023-06-01 DIAGNOSIS — R10.2 SUPRAPUBIC DISCOMFORT: Primary | ICD-10-CM

## 2023-06-01 DIAGNOSIS — N30.00 ACUTE CYSTITIS WITHOUT HEMATURIA: Primary | ICD-10-CM

## 2023-06-01 LAB
BACTERIA UR CULT: NORMAL
C TRACH DNA SPEC QL NAA+PROBE: NEGATIVE
N GONORRHOEA DNA SPEC QL NAA+PROBE: NEGATIVE

## 2023-06-01 RX ORDER — PHENAZOPYRIDINE HYDROCHLORIDE 100 MG/1
100 TABLET, FILM COATED ORAL 3 TIMES DAILY PRN
Qty: 10 TABLET | Refills: 0 | Status: SHIPPED | OUTPATIENT
Start: 2023-06-01 | End: 2023-06-04

## 2023-06-01 RX ORDER — NITROFURANTOIN 25; 75 MG/1; MG/1
100 CAPSULE ORAL 2 TIMES DAILY
Qty: 10 CAPSULE | Refills: 0 | Status: SHIPPED | OUTPATIENT
Start: 2023-06-01 | End: 2023-06-06

## 2023-06-01 NOTE — PATIENT INSTRUCTIONS
At this time I would recommend you complete flagyl  I would call tomorrow to see if they can do ultrasound sooner  If no improvement call ob/gyn tomorrow and discuss with them  Go to ER for worsening symptoms  We can try pyridium to see if that helps

## 2023-06-12 ENCOUNTER — ULTRASOUND (OUTPATIENT)
Dept: GYNECOLOGY | Facility: CLINIC | Age: 28
End: 2023-06-12
Payer: COMMERCIAL

## 2023-06-12 ENCOUNTER — OFFICE VISIT (OUTPATIENT)
Dept: GYNECOLOGY | Facility: CLINIC | Age: 28
End: 2023-06-12
Payer: COMMERCIAL

## 2023-06-12 VITALS — WEIGHT: 184.6 LBS | BODY MASS INDEX: 28.91 KG/M2 | SYSTOLIC BLOOD PRESSURE: 118 MMHG | DIASTOLIC BLOOD PRESSURE: 80 MMHG

## 2023-06-12 DIAGNOSIS — B96.89 BV (BACTERIAL VAGINOSIS): ICD-10-CM

## 2023-06-12 DIAGNOSIS — L70.9 ADULT ACNE: ICD-10-CM

## 2023-06-12 DIAGNOSIS — R10.2 PELVIC PAIN: Primary | ICD-10-CM

## 2023-06-12 DIAGNOSIS — N76.0 BV (BACTERIAL VAGINOSIS): ICD-10-CM

## 2023-06-12 DIAGNOSIS — Z86.59 HISTORY OF ADHD: ICD-10-CM

## 2023-06-12 DIAGNOSIS — Z30.41 ENCOUNTER FOR SURVEILLANCE OF CONTRACEPTIVE PILLS: ICD-10-CM

## 2023-06-12 DIAGNOSIS — E28.2 PCOS (POLYCYSTIC OVARIAN SYNDROME): Primary | ICD-10-CM

## 2023-06-12 PROCEDURE — 99213 OFFICE O/P EST LOW 20 MIN: CPT | Performed by: OBSTETRICS & GYNECOLOGY

## 2023-06-12 PROCEDURE — 76830 TRANSVAGINAL US NON-OB: CPT

## 2023-06-12 RX ORDER — METRONIDAZOLE 7.5 MG/G
1 GEL VAGINAL
Qty: 5 G | Refills: 0 | Status: SHIPPED | OUTPATIENT
Start: 2023-06-12 | End: 2023-06-17

## 2023-06-12 RX ORDER — NORGESTIMATE AND ETHINYL ESTRADIOL 7DAYSX3 LO
1 KIT ORAL DAILY
Qty: 84 TABLET | Refills: 1 | Status: SHIPPED | OUTPATIENT
Start: 2023-06-12 | End: 2023-09-04

## 2023-06-12 NOTE — PROGRESS NOTES
Assessment/Plan:    Diagnoses and all orders for this visit:    PCOS (polycystic ovarian syndrome)    Adult acne  -     norgestimate-ethinyl estradiol (Ortho Tri-Cyclen Lo) 0 18/0 215/0 25 MG-25 MCG per tablet; Take 1 tablet by mouth daily    BV (bacterial vaginosis)  -     metroNIDAZOLE (METROGEL) 0 75 % vaginal gel; Insert 1 application  into the vagina daily at bedtime for 5 days    Encounter for surveillance of contraceptive pills  -     norgestimate-ethinyl estradiol (Ortho Tri-Cyclen Lo) 0 18/0 215/0 25 MG-25 MCG per tablet; Take 1 tablet by mouth daily    History of ADHD        Subjective: Here for pelvic ultrasound and follow-up     Patient ID: Tre Loera is a 34 y o  female  HPI   74-year-old female, history of area menstrual cycles was recently started on Ortho Tri-Cyclen Lo to aid with the treatment of her adult acne  She is doing well with this  She was seen last month had symptoms of vaginal vaginosis vaginal discharge and irritation  Today not having any symptoms but requested prophylactic prescription as she is going out of the country on vacation in the upcoming weeks  Prescription for MetroGel given to patient which is used for symptoms of BV recur  She also uses Mycolog cream intermittently for external vaginal itching  Pelvic ultrasound normal size uterus measuring 6 2 x 2 6 x 4 3 cm, endometrial thickness was 6 1 mm  Both ovaries slightly enlarged, right greater than the left, measuring 4 92 cm in length by 2 6 x 2 7 cm Anteverted uterus appears within normal limits, bilateral ovaries meet sonographic criteria for PCOS without additional cysts or masses noted  No free fluid noted  We discussed reviewed these findings and what is implications or and was likely related to her irregular menstrual cycles  I encouraged her to stay on her OCPs to continue to regulate her cycles and assist in treatment for adult acne    Consider repeat ultrasound about 6 months time otherwise routine follow-up in August for annual exam   All questions answered  Review of Systems  Unchanged    Objective: No acute distress  /80   Wt 83 7 kg (184 lb 9 6 oz)   LMP 06/01/2023 (Approximate)   BMI 28 91 kg/m²      Physical Exam  Vitals and nursing note reviewed  Constitutional:       Appearance: Normal appearance  She is normal weight  HENT:      Head: Normocephalic  Eyes:      Pupils: Pupils are equal, round, and reactive to light  Pulmonary:      Effort: Pulmonary effort is normal    Genitourinary:     Comments: Pelvic exam deferred  Musculoskeletal:         General: Normal range of motion  Neurological:      Mental Status: She is alert and oriented to person, place, and time  Psychiatric:         Mood and Affect: Mood normal          Behavior: Behavior normal          Thought Content: Thought content normal          Judgment: Judgment normal         Please note    In addition to the time spent discussing the findings and results of today's visit and exam, I spent approximately 20 minutes of face-to-face time with the patient, greater than 50% of which was spent in counseling and coordination of care for this patient

## 2023-06-12 NOTE — PROGRESS NOTES
AMB US Pelvic Non OB    Date/Time: 6/12/2023 9:20 AM    Performed by: Roni Cuevas  Authorized by: Radha Saunders DO  Universal Protocol:  Consent given by: patient  Patient identity confirmed: verbally with patient      Procedure details:     Indications: non-obstetric abdominal pain      Technique:  Transvaginal US, Non-OB    Position: lithotomy exam    Uterine findings:     Length (cm): 6 21    Height (cm):  2 67    Width (cm):  4 43    Endometrial stripe: identified      Endometrium thickness (mm):  6 1  Left ovary findings:     Left ovary:  Visualized    Length (cm): 3 66    Height (cm): 2 87    Width (cm): 3 37  Right ovary findings:     Right ovary:  Visualized    Length (cm): 4 92    Height (cm): 2 69    Width (cm): 2 76  Other findings:     Free pelvic fluid: not identified      Free peritoneal fluid: not identified    Post-Procedure Details:     Impression:  Anteverted uterus appears within normal limits  The bilateral ovaries meet the sonographic criteria for PCOS without additional cysts or masses  No free fluid  Tolerance: Tolerated well, no immediate complications    Complications: no complications    Additional Procedure Comments:      [employer] F8 E8C-RS transvaginal transducer Serial # [id number] was used to perform the examination today and subsequently followed with high level disinfection utilizing Trophon EPR procedure  Ultrasound performed at:     [address] [address]  Phone:  [phone]  Fax:  [phone]

## 2023-06-20 ENCOUNTER — AMB VIDEO VISIT (OUTPATIENT)
Dept: OTHER | Facility: HOSPITAL | Age: 28
End: 2023-06-20
Payer: COMMERCIAL

## 2023-06-20 DIAGNOSIS — J01.40 ACUTE NON-RECURRENT PANSINUSITIS: Primary | ICD-10-CM

## 2023-06-20 PROBLEM — B37.31 CANDIDIASIS OF VAGINA: Status: RESOLVED | Noted: 2023-01-20 | Resolved: 2023-06-20

## 2023-06-20 PROBLEM — B96.89 BACTERIAL VAGINOSIS: Status: RESOLVED | Noted: 2023-01-20 | Resolved: 2023-06-20

## 2023-06-20 PROBLEM — N76.0 BACTERIAL VAGINOSIS: Status: RESOLVED | Noted: 2023-01-20 | Resolved: 2023-06-20

## 2023-06-20 PROBLEM — Z30.41 ENCOUNTER FOR SURVEILLANCE OF CONTRACEPTIVE PILLS: Status: RESOLVED | Noted: 2018-04-04 | Resolved: 2023-06-20

## 2023-06-20 PROCEDURE — 99212 OFFICE O/P EST SF 10 MIN: CPT | Performed by: PHYSICIAN ASSISTANT

## 2023-06-20 RX ORDER — OXYMETAZOLINE HYDROCHLORIDE 0.05 G/100ML
2 SPRAY NASAL
Qty: 30 ML | Refills: 0 | Status: SHIPPED | OUTPATIENT
Start: 2023-06-20 | End: 2023-06-23

## 2023-06-20 RX ORDER — CETIRIZINE HYDROCHLORIDE, PSEUDOEPHEDRINE HYDROCHLORIDE 5; 120 MG/1; MG/1
1 TABLET, FILM COATED, EXTENDED RELEASE ORAL 2 TIMES DAILY
Qty: 20 TABLET | Refills: 0 | Status: SHIPPED | OUTPATIENT
Start: 2023-06-20

## 2023-06-20 RX ORDER — FLUTICASONE PROPIONATE 50 MCG
1 SPRAY, SUSPENSION (ML) NASAL DAILY
Qty: 9.9 ML | Refills: 0 | Status: SHIPPED | OUTPATIENT
Start: 2023-06-20

## 2023-06-20 NOTE — PATIENT INSTRUCTIONS
As discussed, sinus infections are typically viral and will get better on their own in 7-10 days  You should try symptomatic relief in the meantime with OTC (Claritin or Zyrtec), Afrin up to 3 days then switch to Flonase, and Sudafed  You can also try sinus rinses with a neti pot or nasal lavage (be sure to use distilled water ) If your symptoms do not improve after 7-10 days, you may need antibiotics because it is more likely to be bacterial at that point  Follow-up with your primary care physician for recheck in 2-3 business days  Go to the ER for sudden severe headache, high fevers, change in vision, seizure activity or anything else that is concerning

## 2023-06-20 NOTE — PROGRESS NOTES
Video Visit - Josee Carlos 34 y o  female MRN: [id number]    REQUIRED DOCUMENTATION:         1  This service was provided via [employer]  2  Provider located at [address] [place] [id number] [phone]  3  [facility] provider: Dominick Lipscomb PA-C   4  Identify all parties [facility]  No one else  5  After connecting through [employer], patient was identified by name and date of birth  Patient was then informed that this was a Telemedicine visit and that the exam was being conducted confidentially over secure lines  My office door was closed  No one else was in the room  Patient acknowledged consent and understanding of privacy and security of the Telemedicine visit  I informed the patient that I have reviewed their record in Epic and presented the opportunity for them to ask any questions regarding the visit today  The patient agreed to participate  VITALS: Heart Rate: 104 BPM, Respiratory Rate: 16 RPM, Temperature Unavailable° F, Blood Pressure Unavailable mmHg, Pulse Ox Unavailable % on RA    HPI  Pt reports sinus infection  Woke up yesterday with watery eyes, scratchy throat, slight cough and rhinorrhea  Last night sx worsened  Tried OTC sinus medication with temporary relief  Reports rhinorrhea and sore eyes with looking side to side  Denies changes to vision  No fevers  Leaving for [facility]  Physical Exam  Constitutional:       General: She is not in acute distress  Appearance: Normal appearance  She is not toxic-appearing  HENT:      Head: Normocephalic and atraumatic  Nose: No rhinorrhea  Comments: Sounds mildly congested     Mouth/Throat:      Mouth: Mucous membranes are moist    Eyes:      Conjunctiva/sclera: Conjunctivae normal       Comments: glasses   Cardiovascular:      Rate and Rhythm: Tachycardia present  Pulmonary:      Effort: Pulmonary effort is normal  No respiratory distress  Breath sounds:  No wheezing (no gross audible wheeze through computer)  Musculoskeletal:      Cervical back: Normal range of motion  Skin:     Findings: No rash (on face or neck)  Neurological:      Mental Status: She is alert  Cranial Nerves: No dysarthria or facial asymmetry  Psychiatric:         Mood and Affect: Mood normal          Behavior: Behavior normal          Diagnoses and all orders for this visit:    Acute non-recurrent pansinusitis  -     fluticasone (FLONASE) 50 mcg/act nasal spray; 1 spray into each nostril daily  -     cetirizine-pseudoephedrine (ZyrTEC-D) 5-120 MG per tablet; Take 1 tablet by mouth 2 (two) times a day  -     oxymetazoline (AFRIN) 0 05 % nasal spray; 2 sprays by Each Nare route daily at bedtime for 3 days  -     Sodium Chloride-Sodium Bicarb (Neti Pot Sinus Wash) 2300-700 MG KIT; 1 Bottle (1 kit total) into each nostril daily as needed (nasal congestion)      Patient Instructions   As discussed, sinus infections are typically viral and will get better on their own in 7-10 days  You should try symptomatic relief in the meantime with OTC (Claritin or Zyrtec), Afrin up to 3 days then switch to Flonase, and Sudafed  You can also try sinus rinses with a neti pot or nasal lavage (be sure to use distilled water ) If your symptoms do not improve after 7-10 days, you may need antibiotics because it is more likely to be bacterial at that point  Follow-up with your primary care physician for recheck in 2-3 business days  Go to the ER for sudden severe headache, high fevers, change in vision, seizure activity or anything else that is concerning

## 2023-06-20 NOTE — CARE ANYWHERE EVISITS
Visit Summary for Ritta Fitting - Gender: Female - Date of Birth: [id number]  Date: [id number] - Duration: 12 minutes  Patient: Ritta Fitting  Provider: Shayla Herrera PA-C    Patient Contact Information  Address  [address] [place] [address]; [address]  [phone]    Visit Topics  Earache [Added By: Self - 2023-06-20]  Cold [Added By: Self - 2023-06-20]  Flu-Like Symptoms [Added By: Self - 2023-06-20]  Headache [Added By: Self - 2023-06-20]    Triage Questions   What is your current physical address in the event of a medical emergency? Answer []  Are you allergic to any medications? Answer []  Are you now or could you be pregnant? Answer []  Do you have any immune system compromise or chronic lung   disease? Answer []  Do you have any vulnerable family members in the home (infant, pregnant, cancer, elderly)? Answer []     Conversation Transcripts  [0A][0A] [Notification] You are connected with Shayla Herrera PA-C, Urgent [facility] [address] [place] [address] is located in [place]  [0A][Notification] Ritta Fitting has shared health history  Shanelle Galindo  [0A]    Diagnosis  Acute pansinusitis    Procedures  Value: [id number] Code: CPT-4 UNLISTED E&M SERVICE    Medications Prescribed    No prescriptions ordered    Electronically signed by: Barbara Hennessy(NPI [id number])

## 2024-04-29 ENCOUNTER — ANNUAL EXAM (OUTPATIENT)
Dept: GYNECOLOGY | Facility: CLINIC | Age: 29
End: 2024-04-29
Payer: COMMERCIAL

## 2024-04-29 VITALS
HEIGHT: 67 IN | DIASTOLIC BLOOD PRESSURE: 86 MMHG | SYSTOLIC BLOOD PRESSURE: 112 MMHG | WEIGHT: 187.2 LBS | BODY MASS INDEX: 29.38 KG/M2

## 2024-04-29 DIAGNOSIS — Z87.42 HISTORY OF PCOS: ICD-10-CM

## 2024-04-29 DIAGNOSIS — Z01.419 WOMEN'S ANNUAL ROUTINE GYNECOLOGICAL EXAMINATION: Primary | ICD-10-CM

## 2024-04-29 DIAGNOSIS — N30.90 CYSTITIS: ICD-10-CM

## 2024-04-29 DIAGNOSIS — Z86.59 HISTORY OF ADHD: ICD-10-CM

## 2024-04-29 DIAGNOSIS — Z98.890 HX OF BILATERAL BREAST REDUCTION SURGERY: ICD-10-CM

## 2024-04-29 PROCEDURE — S0612 ANNUAL GYNECOLOGICAL EXAMINA: HCPCS | Performed by: OBSTETRICS & GYNECOLOGY

## 2024-04-29 PROCEDURE — G0145 SCR C/V CYTO,THINLAYER,RESCR: HCPCS | Performed by: OBSTETRICS & GYNECOLOGY

## 2024-04-29 RX ORDER — NITROFURANTOIN 25; 75 MG/1; MG/1
100 CAPSULE ORAL 2 TIMES DAILY
Qty: 10 CAPSULE | Refills: 0 | Status: SHIPPED | OUTPATIENT
Start: 2024-04-29 | End: 2024-05-04

## 2024-04-29 NOTE — PROGRESS NOTES
"Assessment   Annual well woman exam  Acute cystitis  History of PCOS  History of ADHD  History of bilateral breast reduction  Stopped OCPs on her own        Plan    All questions answered.  Await pap smear results.  Breast self exam technique reviewed and patient encouraged to perform self-exam monthly.  Discussed healthy lifestyle modifications.     Subjective here for annual exam     Sandy Davis is a 29 y.o. female who presents for annual exam. Periods are regular every 28-30 days, lasting 5 days. Dysmenorrhea:none. Cyclic symptoms include none. No intermenstrual bleeding, spotting, or discharge. The patient reports that there is not domestic violence in her life.     Current contraception: coitus interruptus  History of abnormal Pap smear: no  Family history of uterine or ovarian cancer: no  Regular self breast exam: yes  History of abnormal mammogram: no  Family history of breast cancer: no  History of abnormal lipids: no  Menstrual History:  OB History          0    Para   0    Term   0       0    AB   0    Living   0         SAB   0    IAB   0    Ectopic   0    Multiple   0    Live Births   0           Obstetric Comments   Menorrhagia with regular cycle               Menarche age: 14  Patient's last menstrual period was 03/15/2024 (exact date).     Review of Systems  Pertinent items are noted in HPI.      Objective no acute distress   /86 (BP Location: Left arm, Patient Position: Sitting, Cuff Size: Standard)   Ht 5' 7\" (1.702 m)   Wt 84.9 kg (187 lb 3.2 oz)   LMP 03/15/2024 (Exact Date)   BMI 29.32 kg/m²     General:   alert and oriented, in no acute distress, alert, appears stated age, and cooperative   Heart: regular rate and rhythm, S1, S2 normal, no murmur, click, rub or gallop   Lungs: clear to auscultation bilaterally   Abdomen: soft, non-tender, without masses or organomegaly   Vulva: normal, Bartholin's, Urethra, [place]'s normal, female escutcheon   Vagina: normal mucosa, " normal discharge, no palpable nodules   Cervix: anteverted, no cervical motion tenderness, no lesions, and nulliparous appearance   Uterus: normal size, anteverted, mobile, non-tender, normal shape and consistency   Adnexa: normal adnexa and no mass, fullness, tenderness   Bilateral breast exam in the sitting and supine position with chaperone present, no visible or palpable breast lesions identified.  No breast masses noted.    No supraclavicular or axillary lymphadenopathy noted.  No nipple discharge.   Reviewed self-breast exam techniques.   Rectal exam,  deferred

## 2024-05-06 LAB
LAB AP GYN PRIMARY INTERPRETATION: NORMAL
LAB AP LMP: NORMAL
Lab: NORMAL

## 2024-06-03 ENCOUNTER — HOSPITAL ENCOUNTER (EMERGENCY)
Facility: HOSPITAL | Age: 29
Discharge: HOME | End: 2024-06-03
Attending: EMERGENCY MEDICINE
Payer: COMMERCIAL

## 2024-06-03 VITALS
OXYGEN SATURATION: 94 % | TEMPERATURE: 99.4 F | BODY MASS INDEX: 27.55 KG/M2 | RESPIRATION RATE: 16 BRPM | SYSTOLIC BLOOD PRESSURE: 103 MMHG | WEIGHT: 175.93 LBS | HEART RATE: 69 BPM | DIASTOLIC BLOOD PRESSURE: 75 MMHG

## 2024-06-03 DIAGNOSIS — R11.2 NAUSEA & VOMITING: Primary | ICD-10-CM

## 2024-06-03 DIAGNOSIS — B34.9 VIRAL SYNDROME: ICD-10-CM

## 2024-06-03 LAB
ANION GAP SERPL CALCULATED.3IONS-SCNC: 8 MMOL/L (ref 4–13)
BASOPHILS # BLD AUTO: 0.03 THOUSANDS/ÂΜL (ref 0–0.1)
BASOPHILS NFR BLD AUTO: 0 % (ref 0–1)
BUN SERPL-MCNC: 7 MG/DL (ref 5–25)
CALCIUM SERPL-MCNC: 8.1 MG/DL (ref 8.4–10.2)
CHLORIDE SERPL-SCNC: 106 MMOL/L (ref 96–108)
CO2 SERPL-SCNC: 23 MMOL/L (ref 21–32)
CREAT SERPL-MCNC: 0.71 MG/DL (ref 0.6–1.3)
EOSINOPHIL # BLD AUTO: 0.01 THOUSAND/ÂΜL (ref 0–0.61)
EOSINOPHIL NFR BLD AUTO: 0 % (ref 0–6)
ERYTHROCYTE [DISTWIDTH] IN BLOOD BY AUTOMATED COUNT: 12.1 % (ref 11.6–15.1)
FLUAV RNA RESP QL NAA+PROBE: NEGATIVE
FLUBV RNA RESP QL NAA+PROBE: NEGATIVE
GFR SERPL CREATININE-BSD FRML MDRD: 117 ML/MIN/1.73SQ M
GLUCOSE SERPL-MCNC: 104 MG/DL (ref 65–140)
HCT VFR BLD AUTO: 36.5 % (ref 34.8–46.1)
HGB BLD-MCNC: 12.1 G/DL (ref 11.5–15.4)
IMM GRANULOCYTES # BLD AUTO: 0.02 THOUSAND/UL (ref 0–0.2)
IMM GRANULOCYTES NFR BLD AUTO: 0 % (ref 0–2)
LYMPHOCYTES # BLD AUTO: 1.17 THOUSANDS/ÂΜL (ref 0.6–4.47)
LYMPHOCYTES NFR BLD AUTO: 15 % (ref 14–44)
MCH RBC QN AUTO: 28.3 PG (ref 26.8–34.3)
MCHC RBC AUTO-ENTMCNC: 33.2 G/DL (ref 31.4–37.4)
MCV RBC AUTO: 85 FL (ref 82–98)
MONOCYTES # BLD AUTO: 0.65 THOUSAND/ÂΜL (ref 0.17–1.22)
MONOCYTES NFR BLD AUTO: 8 % (ref 4–12)
NEUTROPHILS # BLD AUTO: 6.05 THOUSANDS/ÂΜL (ref 1.85–7.62)
NEUTS SEG NFR BLD AUTO: 77 % (ref 43–75)
NRBC BLD AUTO-RTO: 0 /100 WBCS
PLATELET # BLD AUTO: 161 THOUSANDS/UL (ref 149–390)
PMV BLD AUTO: 10 FL (ref 8.9–12.7)
POTASSIUM SERPL-SCNC: 3.3 MMOL/L (ref 3.5–5.3)
RBC # BLD AUTO: 4.28 MILLION/UL (ref 3.81–5.12)
RSV RNA RESP QL NAA+PROBE: NEGATIVE
S PYO DNA THROAT QL NAA+PROBE: NOT DETECTED
SARS-COV-2 RNA RESP QL NAA+PROBE: NEGATIVE
SODIUM SERPL-SCNC: 137 MMOL/L (ref 135–147)
WBC # BLD AUTO: 7.93 THOUSAND/UL (ref 4.31–10.16)

## 2024-06-03 PROCEDURE — 96361 HYDRATE IV INFUSION ADD-ON: CPT

## 2024-06-03 PROCEDURE — 36415 COLL VENOUS BLD VENIPUNCTURE: CPT

## 2024-06-03 PROCEDURE — 96366 THER/PROPH/DIAG IV INF ADDON: CPT

## 2024-06-03 PROCEDURE — 96372 THER/PROPH/DIAG INJ SC/IM: CPT

## 2024-06-03 PROCEDURE — 85025 COMPLETE CBC W/AUTO DIFF WBC: CPT

## 2024-06-03 PROCEDURE — 87651 STREP A DNA AMP PROBE: CPT

## 2024-06-03 PROCEDURE — 96375 TX/PRO/DX INJ NEW DRUG ADDON: CPT

## 2024-06-03 PROCEDURE — 0241U HB NFCT DS VIR RESP RNA 4 TRGT: CPT

## 2024-06-03 PROCEDURE — 96365 THER/PROPH/DIAG IV INF INIT: CPT

## 2024-06-03 PROCEDURE — 99283 EMERGENCY DEPT VISIT LOW MDM: CPT

## 2024-06-03 PROCEDURE — 99284 EMERGENCY DEPT VISIT MOD MDM: CPT

## 2024-06-03 PROCEDURE — 80048 BASIC METABOLIC PNL TOTAL CA: CPT

## 2024-06-03 RX ORDER — METOCLOPRAMIDE HYDROCHLORIDE 5 MG/ML
10 INJECTION INTRAMUSCULAR; INTRAVENOUS ONCE
Status: COMPLETED | OUTPATIENT
Start: 2024-06-03 | End: 2024-06-03

## 2024-06-03 RX ORDER — POTASSIUM CHLORIDE 20 MEQ/1
20 TABLET, EXTENDED RELEASE ORAL ONCE
Status: COMPLETED | OUTPATIENT
Start: 2024-06-03 | End: 2024-06-03

## 2024-06-03 RX ORDER — DIPHENHYDRAMINE HYDROCHLORIDE 50 MG/ML
25 INJECTION INTRAMUSCULAR; INTRAVENOUS ONCE
Status: COMPLETED | OUTPATIENT
Start: 2024-06-03 | End: 2024-06-03

## 2024-06-03 RX ORDER — ACETAMINOPHEN 10 MG/ML
1000 INJECTION, SOLUTION INTRAVENOUS ONCE
Status: COMPLETED | OUTPATIENT
Start: 2024-06-03 | End: 2024-06-03

## 2024-06-03 RX ORDER — SUMATRIPTAN 6 MG/.5ML
6 INJECTION, SOLUTION SUBCUTANEOUS ONCE
Status: COMPLETED | OUTPATIENT
Start: 2024-06-03 | End: 2024-06-03

## 2024-06-03 RX ORDER — ONDANSETRON 4 MG/1
4 TABLET, FILM COATED ORAL EVERY 6 HOURS
Qty: 12 TABLET | Refills: 0 | Status: SHIPPED | OUTPATIENT
Start: 2024-06-03

## 2024-06-03 RX ORDER — ONDANSETRON 2 MG/ML
4 INJECTION INTRAMUSCULAR; INTRAVENOUS ONCE
Status: COMPLETED | OUTPATIENT
Start: 2024-06-03 | End: 2024-06-03

## 2024-06-03 RX ORDER — KETOROLAC TROMETHAMINE 30 MG/ML
15 INJECTION, SOLUTION INTRAMUSCULAR; INTRAVENOUS ONCE
Status: COMPLETED | OUTPATIENT
Start: 2024-06-03 | End: 2024-06-03

## 2024-06-03 RX ORDER — MAGNESIUM SULFATE HEPTAHYDRATE 40 MG/ML
2 INJECTION, SOLUTION INTRAVENOUS ONCE
Status: COMPLETED | OUTPATIENT
Start: 2024-06-03 | End: 2024-06-03

## 2024-06-03 RX ORDER — KETOROLAC TROMETHAMINE 30 MG/ML
15 INJECTION, SOLUTION INTRAMUSCULAR; INTRAVENOUS ONCE
Status: DISCONTINUED | OUTPATIENT
Start: 2024-06-03 | End: 2024-06-03

## 2024-06-03 RX ADMIN — DIPHENHYDRAMINE HYDROCHLORIDE 25 MG: 50 INJECTION, SOLUTION INTRAMUSCULAR; INTRAVENOUS at 15:25

## 2024-06-03 RX ADMIN — SODIUM CHLORIDE 500 ML: 0.9 INJECTION, SOLUTION INTRAVENOUS at 15:13

## 2024-06-03 RX ADMIN — SODIUM CHLORIDE 1000 ML: 0.9 INJECTION, SOLUTION INTRAVENOUS at 12:54

## 2024-06-03 RX ADMIN — MAGNESIUM SULFATE HEPTAHYDRATE 2 G: 40 INJECTION, SOLUTION INTRAVENOUS at 15:15

## 2024-06-03 RX ADMIN — POTASSIUM CHLORIDE 20 MEQ: 1500 TABLET, EXTENDED RELEASE ORAL at 14:37

## 2024-06-03 RX ADMIN — ACETAMINOPHEN 1000 MG: 10 INJECTION INTRAVENOUS at 13:55

## 2024-06-03 RX ADMIN — SUMATRIPTAN 6 MG: 6 INJECTION, SOLUTION SUBCUTANEOUS at 15:14

## 2024-06-03 RX ADMIN — KETOROLAC TROMETHAMINE 15 MG: 30 INJECTION, SOLUTION INTRAMUSCULAR at 12:52

## 2024-06-03 RX ADMIN — DIPHENHYDRAMINE HYDROCHLORIDE 25 MG: 50 INJECTION, SOLUTION INTRAMUSCULAR; INTRAVENOUS at 15:10

## 2024-06-03 RX ADMIN — METOCLOPRAMIDE 10 MG: 5 INJECTION, SOLUTION INTRAMUSCULAR; INTRAVENOUS at 15:10

## 2024-06-03 RX ADMIN — ONDANSETRON 4 MG: 2 INJECTION INTRAMUSCULAR; INTRAVENOUS at 12:52

## 2024-06-03 NOTE — Clinical Note
Sandy Davis was seen and treated in our emergency department on 6/3/2024.                Diagnosis:     Sandy  .    She may return on this date: 06/05/2024         If you have any questions or concerns, please don't hesitate to call.      Alberto Bear PA-C    ______________________________           _______________          _______________  Hospital Representative                              Date                                Time

## 2024-06-03 NOTE — ED PROVIDER NOTES
History  Chief Complaint   Patient presents with    Fever     Pt complaining of fever and bodyaches since yesterday. Recently returned from [place]. Last dose of tylenol and advil at 0800.     This is a 29-year-old female who presents to the ED for evaluation of symptoms.  Patient reports subjective fever since yesterday, also complains of diffuse body aches and headache.  Of note, she does report a recent trip to [place], denies any known personal sick contacts.  She reports runny nose, cough, headache, nonbloody emesis.  She denies any vision changes, lightheadedness or dizziness, painful neck or stiff neck, chest pain, SOB, abdominal pain, diarrhea, dysuria.        Prior to Admission Medications   Prescriptions Last Dose Informant Patient Reported? Taking?   Sodium Chloride-Sodium Bicarb (Neti Pot Sinus Wash) 2300-700 MG KIT   No No   Si Bottle (1 kit total) into each nostril daily as needed (nasal congestion)   Patient not taking: Reported on 2024   cetirizine-pseudoephedrine (ZyrTEC-D) 5-120 MG per tablet   No No   Sig: Take 1 tablet by mouth 2 (two) times a day   fluticasone (FLONASE) 50 mcg/act nasal spray   No No   Si spray into each nostril daily   ibuprofen (MOTRIN) 600 mg tablet   No No   Sig: Take 1 tablet (600 mg total) by mouth every 6 (six) hours as needed for mild pain   norgestimate-ethinyl estradiol (Ortho Tri-Cyclen Lo) 0.18/0.215/0.25 MG-25 MCG per tablet   No No   Sig: Take 1 tablet by mouth daily   oxymetazoline (AFRIN) 0.05 % nasal spray   No No   Si sprays by Each Nare route daily at bedtime for 3 days      Facility-Administered Medications: None       Past Medical History:   Diagnosis Date    ADHD (attention deficit hyperactivity disorder), predominantly hyperactive impulsive type     Concussion     Dysmenorrhea     Urinary tract infection        Past Surgical History:   Procedure Laterality Date    APPENDECTOMY      REDUCTION MAMMAPLASTY Bilateral 05/15/2023     TONSILLECTOMY AND ADENOIDECTOMY         Family History   Problem Relation Age of Onset    Hypertension Father     Asthma Maternal Grandmother     Breast cancer Maternal Grandmother     Ovarian cysts Maternal Grandmother     Ovarian cysts Mother     Ovarian cysts Paternal Aunt      I have reviewed and agree with the history as documented.    E-Cigarette/Vaping    E-Cigarette Use Never User      E-Cigarette/Vaping Substances    Nicotine No     THC No     CBD No     Flavoring No     Other No     Unknown No      Social History     Tobacco Use    Smoking status: Never    Smokeless tobacco: Never   Vaping Use    Vaping status: Never Used   Substance Use Topics    Alcohol use: Not Currently     Comment: Socially    Drug use: No       Review of Systems   Constitutional:  Positive for chills.   HENT:  Positive for rhinorrhea and sore throat.    Eyes:  Negative for photophobia and visual disturbance.   Respiratory:  Positive for cough. Negative for shortness of breath.    Cardiovascular:  Negative for chest pain and palpitations.   Gastrointestinal:  Positive for nausea and vomiting. Negative for abdominal pain and diarrhea.   Genitourinary:  Negative for difficulty urinating, dysuria, flank pain and hematuria.   Musculoskeletal:  Negative for neck pain and neck stiffness.   Skin:  Negative for rash.   Neurological:  Positive for headaches. Negative for dizziness, syncope, weakness and light-headedness.       Physical Exam  Physical Exam  Vitals and nursing note reviewed.   Constitutional:       General: She is not in acute distress.     Appearance: She is well-developed. She is not toxic-appearing or diaphoretic.   HENT:      Head: Normocephalic and atraumatic.   Eyes:      Conjunctiva/sclera: Conjunctivae normal.   Cardiovascular:      Rate and Rhythm: Normal rate and regular rhythm.      Heart sounds: No murmur heard.  Pulmonary:      Effort: Pulmonary effort is normal. No respiratory distress.      Breath sounds: Normal  breath sounds.   Abdominal:      Palpations: Abdomen is soft.      Tenderness: There is no abdominal tenderness. There is no right CVA tenderness or left CVA tenderness.   Musculoskeletal:         General: No swelling.      Cervical back: Normal range of motion and neck supple. No rigidity.   Skin:     General: Skin is warm and dry.      Capillary Refill: Capillary refill takes less than 2 seconds.   Neurological:      General: No focal deficit present.      Mental Status: She is alert and oriented to person, place, and time.      GCS: GCS eye subscore is 4. GCS verbal subscore is 5. GCS motor subscore is 6.      Cranial Nerves: Cranial nerves 2-12 are intact.      Sensory: Sensation is intact.      Motor: Motor function is intact.      Coordination: Coordination is intact.      Gait: Gait is intact.   Psychiatric:         Mood and Affect: Mood normal.         Vital Signs  ED Triage Vitals   Temperature Pulse Respirations Blood Pressure SpO2   06/03/24 1208 06/03/24 1208 06/03/24 1208 06/03/24 1208 06/03/24 1208   99.4 °F (37.4 °C) (!) 111 16 114/73 95 %      Temp Source Heart Rate Source Patient Position - Orthostatic VS BP Location FiO2 (%)   06/03/24 1208 06/03/24 1208 06/03/24 1208 06/03/24 1208 --   Oral Monitor Lying Right arm       Pain Score       06/03/24 1252       7           Vitals:    06/03/24 1208 06/03/24 1547   BP: 114/73 103/75   Pulse: (!) 111 69   Patient Position - Orthostatic VS: Lying Lying         Visual Acuity      ED Medications  Medications   sodium chloride 0.9 % bolus 1,000 mL (0 mL Intravenous Stopped 6/3/24 1635)   ondansetron (ZOFRAN) injection 4 mg (4 mg Intravenous Given 6/3/24 1252)   ketorolac (TORADOL) injection 15 mg (15 mg Intravenous Given 6/3/24 1252)   acetaminophen (Ofirmev) injection 1,000 mg (0 mg Intravenous Stopped 6/3/24 1410)   potassium chloride (Klor-Con M20) CR tablet 20 mEq (20 mEq Oral Given 6/3/24 1437)   metoclopramide (REGLAN) injection 10 mg (10 mg Intravenous  Given 6/3/24 1510)   diphenhydrAMINE (BENADRYL) injection 25 mg (25 mg Intravenous Given 6/3/24 1510)   SUMAtriptan (IMITREX) subcutaneous injection 6 mg (6 mg Subcutaneous Given 6/3/24 1514)   magnesium sulfate 2 g/50 mL IVPB (premix) 2 g (0 g Intravenous Stopped 6/3/24 1718)   sodium chloride 0.9 % bolus 500 mL (0 mL Intravenous Stopped 6/3/24 1718)   diphenhydrAMINE (BENADRYL) injection 25 mg (25 mg Intravenous Given 6/3/24 1525)       Diagnostic Studies  Results Reviewed       Procedure Component Value Units Date/Time    Basic metabolic panel [[id number]]  (Abnormal) Collected: 06/03/24 1353    Lab Status: Final result Specimen: Blood from Arm, Right Updated: 06/03/24 1423     Sodium 137 mmol/L      Potassium 3.3 mmol/L      Chloride 106 mmol/L      CO2 23 mmol/L      ANION GAP 8 mmol/L      BUN 7 mg/dL      Creatinine 0.71 mg/dL      Glucose 104 mg/dL      Calcium 8.1 mg/dL      eGFR 117 ml/min/1.73sq m     Narrative:      National Kidney Disease Foundation guidelines for Chronic Kidney Disease (CKD):     Stage 1 with normal or high GFR (GFR > 90 mL/min/1.73 square meters)    Stage 2 Mild CKD (GFR = 60-89 mL/min/1.73 square meters)    Stage 3A Moderate CKD (GFR = 45-59 mL/min/1.73 square meters)    Stage 3B Moderate CKD (GFR = 30-44 mL/min/1.73 square meters)    Stage 4 Severe CKD (GFR = 15-29 mL/min/1.73 square meters)    Stage 5 End Stage CKD (GFR <15 mL/min/1.73 square meters)  Note: GFR calculation is accurate only with a steady state creatinine    CBC and differential [[id number]]  (Abnormal) Collected: 06/03/24 1353    Lab Status: Final result Specimen: Blood from Arm, Right Updated: 06/03/24 1401     WBC 7.93 Thousand/uL      RBC 4.28 Million/uL      Hemoglobin 12.1 g/dL      Hematocrit 36.5 %      MCV 85 fL      MCH 28.3 pg      MCHC 33.2 g/dL      RDW 12.1 %      MPV 10.0 fL      Platelets 161 Thousands/uL      nRBC 0 /100 WBCs      Segmented % 77 %      Immature Grans % 0 %      Lymphocytes % 15 %       Monocytes % 8 %      Eosinophils Relative 0 %      Basophils Relative 0 %      Absolute Neutrophils 6.05 Thousands/µL      Absolute Immature Grans 0.02 Thousand/uL      Absolute Lymphocytes 1.17 Thousands/µL      Absolute Monocytes 0.65 Thousand/µL      Eosinophils Absolute 0.01 Thousand/µL      Basophils Absolute 0.03 Thousands/µL     FLU/RSV/COVID - if FLU/RSV clinically relevant [[id number]]  (Normal) Collected: 06/03/24 1255    Lab Status: Final result Specimen: Nares from Nose Updated: 06/03/24 1349     SARS-CoV-2 Negative     INFLUENZA A PCR Negative     INFLUENZA B PCR Negative     RSV PCR Negative    Narrative:      FOR PEDIATRIC PATIENTS - copy/paste COVID Guidelines URL to browser: [email or web address]    SARS-CoV-2 assay is a Nucleic Acid Amplification assay intended for the  qualitative detection of nucleic acid from SARS-CoV-2 in nasopharyngeal  swabs. Results are for the presumptive identification of SARS-CoV-2 RNA.    Positive results are indicative of infection with SARS-CoV-2, the virus  causing COVID-19, but do not rule out bacterial infection or co-infection  with other viruses. Laboratories within the [place] and its  territories are required to report all positive results to the appropriate  public health authorities. Negative results do not preclude SARS-CoV-2  infection and should not be used as the sole basis for treatment or other  patient management decisions. Negative results must be combined with  clinical observations, patient history, and epidemiological information.  This test has not been FDA cleared or approved.    This test has been authorized by FDA under an Emergency Use Authorization  (EUA). This test is only authorized for the duration of time the  declaration that circumstances exist justifying the authorization of the  emergency use of an in vitro diagnostic tests for detection of SARS-CoV-2  virus and/or diagnosis of  COVID-19 infection under section 564(b)(1) of  the Act, 21 U.S.C. 360bbb-3(b)(1), unless the authorization is terminated  or revoked sooner. The test has been validated but independent review by FDA  and CLIA is pending.    Test performed using [employer]: This RT-PCR assay targets N2,  a region unique to SARS-CoV-2. A conserved region in the E-gene was chosen  for pan-Sarbecovirus detection which includes SARS-CoV-2.    According to CMS-2020-01-R, this platform meets the definition of high-throughput technology.    Strep A PCR [[id number]]  (Normal) Collected: 06/03/24 1255    Lab Status: Final result Specimen: Throat Updated: 06/03/24 1335     STREP A PCR Not Detected                   No orders to display              Procedures  Procedures         ED Course  ED Course as of 06/03/24 1737   Mon Jun 03, 2024   1556 Patient reports significant improvement in symptoms at this time.                                 SBIRT 22yo+      Flowsheet Row Most Recent Value   Initial Alcohol Screen: US AUDIT-C     1. How often do you have a drink containing alcohol? 0 Filed at: 06/03/2024 1237   2. How many drinks containing alcohol do you have on a typical day you are drinking?  0 Filed at: 06/03/2024 1237   3b. FEMALE Any Age, or MALE 65+: How often do you have 4 or more drinks on one occassion? 0 Filed at: 06/03/2024 1237   Audit-C Score 0 Filed at: 06/03/2024 1237   SUREKHA: How many times in the past year have you...    Used an illegal drug or used a prescription medication for non-medical reasons? Never Filed at: 06/03/2024 1237                      Medical Decision Making  29-year-old female presents ED with flulike symptoms.  Patient afebrile in ED  Initially mildly tachycardic though fluid responsive.  Labs acutely unremarkable, COVID/flu/RSV negative.  Patient able to tolerate p.o. intake in ED.  Advised PCP follow-up as needed for further evaluation and management.  ED return precautions discussed with patient.   Patient verbalized understanding and agreement with plan.    Amount and/or Complexity of Data Reviewed  Labs: ordered.    Risk  Prescription drug management.             Disposition  Final diagnoses:   Nausea & vomiting   Viral syndrome     Time reflects when diagnosis was documented in both MDM as applicable and the Disposition within this note       Time User Action Codes Description Comment    6/3/2024  2:50 PM Alberto Bear [R11.2] Nausea & vomiting     6/3/2024  3:56 PM Alberto Bear [B34.9] Viral syndrome           ED Disposition       ED Disposition   Discharge    Condition   Stable    Date/Time   Mon Huber 3, 2024 1556    Comment   Sandy Davis discharge to home/self care.                   Follow-up Information       Follow up With Specialties Details Why Contact Info    ERIK Marin Nurse Practitioner Schedule an appointment as soon as possible for a visit  For re-check [facility]  [address]  [place] [id number] [phone]              Patient's Medications   Discharge Prescriptions    ONDANSETRON (ZOFRAN) 4 MG TABLET    Take 1 tablet (4 mg total) by mouth every 6 (six) hours       Start Date: 6/3/2024  End Date: --       Order Dose: 4 mg       Quantity: 12 tablet    Refills: 0       No discharge procedures on file.    PDMP Review       None            ED Provider  Electronically Signed by             Alberto Bear PA-C  06/03/24 9811

## 2024-06-03 NOTE — DISCHARGE INSTRUCTIONS
Take Zofran as needed.  Drink plenty of fluids.  Follow-up with your primary care provider for further evaluation and management.  Return to the ED if you develop any new or worsening symptoms.

## 2025-04-13 NOTE — PROGRESS NOTES
Assessment & Plan  Overweight (BMI 25.0-29.9)     Current weight: 188    Medication: Prescribed  Zepbound 2.5mg     Patient has tried to maintain healthy dietary changes for greater than 6 months     Patient has a BMI greater than 29 associated with co-morbidities such as PCOS, ADHD     If you are starting the medication, please hold medication 1 week prior to any surgical intervention that requires anesthesia      Discussed options of [facility]-Intensive Lifestyle Intervention Program, VLCD and Conservative Program.     Patient understands the importance of making lifestyle changes as recommended below to aid in weight loss     Dietary Recommendations:  Recommend to avoid skipping any meals. Adequate amount of Macronutrients (minimal 3 meals a day) is necessary to help improve metabolism, satiety and allow for better portion control by decreasing Ghrelin (hunger hormone). Lack of hunger can be suppressed by a hormone called Leptin (full hormone) which can occur from a previous meal or caffeine intake    Protein intake throughout the day can help promote satiety and is necessary for muscle growth/repair    Carbohydrates are essential as it is the vital source of fuel for daily activities. energy, cell function, nutrient absorption, and hormone production.     Fats: Essential vitamins like A, D, and E, support cell growth, function and are necessary for nutrient absorption to support your organs     Fluid intake which is at least half your body weight in ounces is necessary to help control cravings (decreasing confusion for appetite vs water deprivation) as the human body is made up of 50-70% of Fluids. If there is a diversion for water alone, would recommend flavored water (example-splash of lemonade or ice tea) to help promote compliance. Fluids include Teas, water, flavored water, seltzer water, coffee, shakes    Metabolism:    Metabolism can also be promoted by macronutrient intake and increased muscle  "weight via thermogenesis      Daily Calorie Needs: Recommend to take into account any fluid losses and calories burned via increased activity levels as daily calories may need to be adjusted     Weight check: Weights can fluctuate depending on fluid shifts vs what foods are consumed prior to checking your weight    Recent notes, labs and records were reviewed. Total time with chart review and with the patient: 45 min            Return in about 10 weeks (around 6/23/2025) for followu.   ______________________________________________________________________        Subjective:     Chief Complaint   Patient presents with    Consult     MWM  Consult  , Wt goal  150 lb . Waist  32 inch ,  S/B   1/8        HPI: 30 y.o. female with pmh of ADHD, PCOS  presents to the weight management clinic for consultation.     Wt Loss History:   Onset: couple years  Meal planning   My fitness pal, higher protein. High intensity cardio.      Dietary Regimen:  Breakfast: Hard boil eggs, peanut butter   Lunch: Rice, Taco bowel, cheese   Dinner: Chicken fish vegetables                Snacks: Fair-life protein shake   Cravings: Sweets  Fluids: 100 ounces     Lifestyle hx:  Exercise: Walks->30 min 45 min, 2-4 miles   Occupation: [employer] [place]  over 10k     Review Of Systems:  General: No pallor, no weakness   Pulmonary: Negative for shortness of breath  Chest: negative for chest pain  Gastrointestinal:  Negative for abdominal pain, diarrhea   Psychiatric/Behavioral:  Negative for behavioral problems, confusion, dysphoric mood and hallucinations.    All other systems reviewed and are negative.     Objective:  /64 (Patient Position: Sitting, Cuff Size: Standard)   Pulse 72   Temp 97.8 °F (36.6 °C) (Tympanic)   Ht 5' 7\" (1.702 m)   Wt 85.4 kg (188 lb 3.2 oz)   BMI 29.48 kg/m²     Wt Readings from Last 30 Encounters:   04/14/25 85.4 kg (188 lb 3.2 oz)   06/03/24 79.8 kg (175 lb 14.8 oz)   04/29/24 84.9 kg (187 lb 3.2 oz) "   06/12/23 83.7 kg (184 lb 9.6 oz)   05/31/23 86.5 kg (190 lb 12.8 oz)   01/20/23 77.6 kg (171 lb)   08/29/22 81.6 kg (180 lb)   08/25/21 78.7 kg (173 lb 9.6 oz)   11/04/20 74.4 kg (164 lb)   08/12/20 73.5 kg (162 lb)   04/24/19 74.4 kg (164 lb)   04/04/18 78.3 kg (172 lb 9.6 oz)   02/07/17 75.5 kg (166 lb 8 oz) (90%, Z= 1.29)*   02/02/16 72.6 kg (160 lb) (89%, Z= 1.20)*   12/03/15 73 kg (161 lb) (89%, Z= 1.24)*   07/24/15 72.6 kg (160 lb) (89%, Z= 1.25)*   07/22/15 73.3 kg (161 lb 8 oz) (90%, Z= 1.28)*   04/06/15 71.2 kg (157 lb) (88%, Z= 1.19)*   07/16/14 64.4 kg (142 lb) (79%, Z= 0.81)*     * Growth percentiles are based on [facility] (Girls, 2-20 Years) data.       Physical Exam  Constitutional:       General: No acute distress.  Well-nourished  HENT:      Head: Normocephalic and atraumatic.   Eyes:      Extraocular Movements: Extraocular movements intact.      Conjunctiva/pupils: Conjunctivae normal. Pupils are equal, round  Pulmonary:      Effort: Pulmonary effort is normal. No labored breathing   Neurological:      General: No focal deficit present.  AO x 3     Mental Status: Alert and oriented to person, place, and time. Mental status is at baseline.   Psychiatric:         Mood and Affect: Mood normal.         Behavior: Behavior normal.     Labs and Imaging  Recent labs and imaging have been personally reviewed.

## 2025-04-14 ENCOUNTER — OFFICE VISIT (OUTPATIENT)
Dept: BARIATRICS | Facility: CLINIC | Age: 30
End: 2025-04-14
Payer: COMMERCIAL

## 2025-04-14 VITALS
SYSTOLIC BLOOD PRESSURE: 102 MMHG | TEMPERATURE: 97.8 F | BODY MASS INDEX: 29.54 KG/M2 | WEIGHT: 188.2 LBS | HEART RATE: 72 BPM | DIASTOLIC BLOOD PRESSURE: 64 MMHG | HEIGHT: 67 IN

## 2025-04-14 DIAGNOSIS — E28.2 PCOS (POLYCYSTIC OVARIAN SYNDROME): ICD-10-CM

## 2025-04-14 DIAGNOSIS — E66.3 OVERWEIGHT (BMI 25.0-29.9): ICD-10-CM

## 2025-04-14 DIAGNOSIS — E66.3 OVERWEIGHT (BMI 25.0-29.9): Primary | ICD-10-CM

## 2025-04-14 PROCEDURE — 99204 OFFICE O/P NEW MOD 45 MIN: CPT | Performed by: STUDENT IN AN ORGANIZED HEALTH CARE EDUCATION/TRAINING PROGRAM

## 2025-04-14 RX ORDER — TIRZEPATIDE 2.5 MG/.5ML
2.5 INJECTION, SOLUTION SUBCUTANEOUS WEEKLY
Qty: 2 ML | Refills: 0 | Status: SHIPPED | OUTPATIENT
Start: 2025-04-14 | End: 2025-05-12

## 2025-04-15 RX ORDER — TIRZEPATIDE 2.5 MG/.5ML
2.5 INJECTION, SOLUTION SUBCUTANEOUS WEEKLY
Refills: 0 | OUTPATIENT
Start: 2025-04-15 | End: 2025-05-13

## 2025-04-22 ENCOUNTER — TELEPHONE (OUTPATIENT)
Dept: BARIATRICS | Facility: CLINIC | Age: 30
End: 2025-04-22

## 2025-04-22 NOTE — TELEPHONE ENCOUNTER
PA for zepbound 2.5mgSUBMITTED to capital    via    []CMM-KEY: [id number]  []Surescripts-Case ID #   []Availity-Auth ID # NDC #   []Faxed to plan   []Other website   []Phone call Case ID #     []PA sent as URGENT    All office notes, labs and other pertaining documents and studies sent. Clinical questions answered. Awaiting determination from insurance company.     Turnaround time for your insurance to make a decision on your Prior Authorization can take 7-21 business days.

## 2025-04-24 NOTE — TELEPHONE ENCOUNTER
PA for zepbound 2.5mg DENIED    Reason:(Screenshot if applicable)    Plan exclusion     Message sent to office clinical pool Yes    Denial letter scanned into Media Yes    Appeal started No (Provider will need to decide if appeal is warranted and send clinical documentation to Prior Authorization Team for initiation.)    **Please follow up with your patient regarding denial and next steps**

## 2025-06-12 ENCOUNTER — ANNUAL EXAM (OUTPATIENT)
Dept: GYNECOLOGY | Facility: CLINIC | Age: 30
End: 2025-06-12
Payer: COMMERCIAL

## 2025-06-12 VITALS
HEIGHT: 67 IN | SYSTOLIC BLOOD PRESSURE: 114 MMHG | BODY MASS INDEX: 29.35 KG/M2 | DIASTOLIC BLOOD PRESSURE: 68 MMHG | WEIGHT: 187 LBS

## 2025-06-12 DIAGNOSIS — Z01.419 ENCOUNTER FOR ANNUAL ROUTINE GYNECOLOGICAL EXAMINATION: Primary | ICD-10-CM

## 2025-06-12 DIAGNOSIS — Z11.3 SCREEN FOR STD (SEXUALLY TRANSMITTED DISEASE): ICD-10-CM

## 2025-06-12 PROCEDURE — 87591 N.GONORRHOEAE DNA AMP PROB: CPT | Performed by: OBSTETRICS & GYNECOLOGY

## 2025-06-12 PROCEDURE — 87491 CHLMYD TRACH DNA AMP PROBE: CPT | Performed by: OBSTETRICS & GYNECOLOGY

## 2025-06-12 PROCEDURE — S0612 ANNUAL GYNECOLOGICAL EXAMINA: HCPCS | Performed by: OBSTETRICS & GYNECOLOGY

## 2025-06-12 NOTE — PROGRESS NOTES
Name: Sandy Davis      : 1995      MRN: [id number]  Encounter Provider: Mar Obrien DO  Encounter Date: 2025   Encounter department: [facility]  :  Assessment & Plan    Her Pap is up-to-date    Chlamydia and gonorrhea done today    Reviewed self breast exam and breast awareness    Contraception-she will continue using condoms.  If she decides that she would like another form of contraception, she will call    Recommended a multivitamin to ensure adequate folic acid with thoughts of future pregnancy.    History of Present Illness   HPI  Sandy Davis is a 30 y.o. female who presents for yearly.  She has regular menstrual cycles.  History of PCOS however they have been regular.  She also has a history of heavy and painful menstrual cycles but they are manageable now.  She is sexually active and using condoms consistently for contraception.  She is not interested in pregnancy for few years.  She would like chlamydia and gonorrhea testing.    Normal pap in       Review of Systems   Constitutional: Negative.    Gastrointestinal: Negative.    Genitourinary: Negative.           Objective   There were no vitals taken for this visit.     Physical Exam  Vitals and nursing note reviewed. Exam conducted with a chaperone present.   Constitutional:       Appearance: She is well-developed.   HENT:      Head: Normocephalic and atraumatic.   Neck:      Thyroid: No thyromegaly.     Cardiovascular:      Rate and Rhythm: Normal rate and regular rhythm.   Pulmonary:      Effort: Pulmonary effort is normal.      Breath sounds: Normal breath sounds.   Chest:   Breasts:     Right: Normal.      Left: Normal.      Comments: Examined seated and supine  Abdominal:      Palpations: Abdomen is soft.   Genitourinary:     General: Normal vulva.      Vagina: Normal.      Cervix: Normal.      Uterus: Normal.       Adnexa: Right adnexa normal and left adnexa normal.     Musculoskeletal:      Cervical  back: Neck supple.     Skin:     General: Skin is warm and dry.     Neurological:      Mental Status: She is alert.     Psychiatric:         Mood and Affect: Mood normal.

## 2025-06-13 ENCOUNTER — RESULTS FOLLOW-UP (OUTPATIENT)
Dept: GYNECOLOGY | Facility: CLINIC | Age: 30
End: 2025-06-13

## 2025-06-13 LAB
C TRACH DNA SPEC QL NAA+PROBE: NEGATIVE
N GONORRHOEA DNA SPEC QL NAA+PROBE: NEGATIVE

## 2025-06-30 ENCOUNTER — TELEPHONE (OUTPATIENT)
Dept: BARIATRICS | Facility: CLINIC | Age: 30
End: 2025-06-30

## 2025-08-26 PROBLEM — R10.2 PELVIC PAIN: Status: ACTIVE | Noted: 2025-08-26
